# Patient Record
Sex: MALE | Race: OTHER | Employment: FULL TIME | URBAN - METROPOLITAN AREA
[De-identification: names, ages, dates, MRNs, and addresses within clinical notes are randomized per-mention and may not be internally consistent; named-entity substitution may affect disease eponyms.]

---

## 2017-03-12 ENCOUNTER — HOSPITAL ENCOUNTER (OUTPATIENT)
Age: 42
Discharge: HOME | End: 2017-03-12
Payer: COMMERCIAL

## 2017-03-12 VITALS
TEMPERATURE: 98 F | WEIGHT: 232 LBS | RESPIRATION RATE: 16 BRPM | HEART RATE: 93 BPM | BODY MASS INDEX: 32.48 KG/M2 | HEIGHT: 71 IN | DIASTOLIC BLOOD PRESSURE: 90 MMHG | SYSTOLIC BLOOD PRESSURE: 132 MMHG | OXYGEN SATURATION: 97 %

## 2017-03-12 DIAGNOSIS — J06.9 VIRAL UPPER RESPIRATORY TRACT INFECTION WITH COUGH: Primary | ICD-10-CM

## 2017-03-12 LAB — POCT RAPID STREP: NEGATIVE

## 2017-03-12 PROCEDURE — 87430 STREP A AG IA: CPT | Performed by: PHYSICIAN ASSISTANT

## 2017-03-12 PROCEDURE — 87081 CULTURE SCREEN ONLY: CPT | Performed by: PHYSICIAN ASSISTANT

## 2017-03-12 PROCEDURE — 99214 OFFICE O/P EST MOD 30 MIN: CPT

## 2017-03-12 RX ORDER — FLUTICASONE PROPIONATE 50 MCG
2 SPRAY, SUSPENSION (ML) NASAL DAILY
Qty: 16 G | Refills: 0 | Status: SHIPPED | OUTPATIENT
Start: 2017-03-12 | End: 2017-04-11

## 2017-03-12 RX ORDER — BENZONATATE 200 MG/1
200 CAPSULE ORAL 3 TIMES DAILY PRN
Qty: 30 CAPSULE | Refills: 0 | Status: SHIPPED | OUTPATIENT
Start: 2017-03-12 | End: 2017-03-19

## 2017-03-12 RX ORDER — LOSARTAN POTASSIUM 50 MG/1
TABLET ORAL DAILY
COMMUNITY
End: 2018-08-16

## 2017-03-12 RX ORDER — CODEINE PHOSPHATE AND GUAIFENESIN 10; 100 MG/5ML; MG/5ML
5 SOLUTION ORAL NIGHTLY PRN
Qty: 150 ML | Refills: 0 | Status: SHIPPED | OUTPATIENT
Start: 2017-03-12 | End: 2018-04-04

## 2017-03-12 NOTE — ED PROVIDER NOTES
Patient Seen in: [address] [facility]    History   Patient presents with:  Cough/URI    Stated Complaint: ST    HPI    Mr. Adali Lamb is a pleasant 74-year-old male who comes in complaining of a cough and sore throat for approximately 2-3 weeks.   H User                        Types: Chew    Comment: Uses chewing tobacco daily      Review of Systems    Positive for stated complaint: ST  Other systems are as noted in HPI. Constitutional and vital signs reviewed.       All other systems reviewed and neg today and remains so upon discharge.  I discuss the plan of care with the patient, who expresses understanding.  All questions and concerns are addressed to the patient's satisfaction prior to discharge today.       Disposition and Plan     Clinical Impress

## 2017-04-26 ENCOUNTER — HOSPITAL ENCOUNTER (OUTPATIENT)
Age: 42
Discharge: HOME | End: 2017-04-26
Payer: COMMERCIAL

## 2017-04-26 VITALS
WEIGHT: 233 LBS | RESPIRATION RATE: 18 BRPM | DIASTOLIC BLOOD PRESSURE: 96 MMHG | BODY MASS INDEX: 32.62 KG/M2 | SYSTOLIC BLOOD PRESSURE: 134 MMHG | HEIGHT: 71 IN | TEMPERATURE: 98 F | OXYGEN SATURATION: 97 % | HEART RATE: 88 BPM

## 2017-04-26 DIAGNOSIS — J06.9 VIRAL UPPER RESPIRATORY ILLNESS: Primary | ICD-10-CM

## 2017-04-26 PROCEDURE — 99214 OFFICE O/P EST MOD 30 MIN: CPT

## 2017-04-26 PROCEDURE — 99213 OFFICE O/P EST LOW 20 MIN: CPT

## 2017-04-26 RX ORDER — CODEINE PHOSPHATE AND GUAIFENESIN 10; 100 MG/5ML; MG/5ML
10 SOLUTION ORAL NIGHTLY PRN
Qty: 118 ML | Refills: 0 | Status: SHIPPED | OUTPATIENT
Start: 2017-04-26 | End: 2018-04-04

## 2017-04-26 RX ORDER — METHYLPREDNISOLONE 4 MG/1
TABLET ORAL
Qty: 1 PACKAGE | Refills: 0 | Status: SHIPPED | OUTPATIENT
Start: 2017-04-26 | End: 2018-04-04

## 2017-04-26 RX ORDER — MONTELUKAST SODIUM 10 MG/1
10 TABLET ORAL NIGHTLY
Qty: 21 TABLET | Refills: 0 | Status: SHIPPED | OUTPATIENT
Start: 2017-04-26 | End: 2018-04-04

## 2017-04-26 NOTE — ED PROVIDER NOTES
Patient Seen in: [address] [facility]    History   Patient presents with:  Cough    Stated Complaint: dry cough x 10 days    HPI    Patient is a pleasant 16-year-old male with a history of hypertension and anxiety.   Patient arrives for evaluat Maternal Grandmother    • Cancer Sister      Breast.   • Heart Disease Brother 50     MI. Alive.    • Stroke Neg          Smoking Status: Never Smoker                      Smokeless Status: Current User                        Types: Chew    Comment: Uses c on a Medrol Dosepak and Singulair at night. Push clear fluids. Monitor for colored nasal secretions. Monitor for fever or chills. Monitor for night sweats or myalgias. At this time, he is well-appearing with normal vital signs.   He has audible nasal c

## 2018-04-04 ENCOUNTER — HOSPITAL ENCOUNTER (OUTPATIENT)
Age: 43
Discharge: HOME | End: 2018-04-04
Attending: FAMILY MEDICINE
Payer: COMMERCIAL

## 2018-04-04 ENCOUNTER — TELEPHONE (OUTPATIENT)
Dept: FAMILY MEDICINE CLINIC | Facility: CLINIC | Age: 43
End: 2018-04-04

## 2018-04-04 VITALS
SYSTOLIC BLOOD PRESSURE: 140 MMHG | DIASTOLIC BLOOD PRESSURE: 107 MMHG | RESPIRATION RATE: 16 BRPM | OXYGEN SATURATION: 99 % | WEIGHT: 230 LBS | BODY MASS INDEX: 32 KG/M2 | TEMPERATURE: 98 F | HEART RATE: 76 BPM

## 2018-04-04 DIAGNOSIS — I10 UNCONTROLLED HYPERTENSION: Primary | ICD-10-CM

## 2018-04-04 PROCEDURE — 99214 OFFICE O/P EST MOD 30 MIN: CPT

## 2018-04-04 PROCEDURE — 36415 COLL VENOUS BLD VENIPUNCTURE: CPT

## 2018-04-04 PROCEDURE — 93010 ELECTROCARDIOGRAM REPORT: CPT

## 2018-04-04 PROCEDURE — 93005 ELECTROCARDIOGRAM TRACING: CPT

## 2018-04-04 PROCEDURE — 84484 ASSAY OF TROPONIN QUANT: CPT

## 2018-04-04 RX ORDER — AMLODIPINE BESYLATE 5 MG/1
5 TABLET ORAL DAILY
Qty: 30 TABLET | Refills: 0 | Status: SHIPPED | OUTPATIENT
Start: 2018-04-04 | End: 2018-05-04

## 2018-04-04 RX ORDER — PANTOPRAZOLE SODIUM 40 MG/1
40 TABLET, DELAYED RELEASE ORAL
COMMUNITY
End: 2019-03-07

## 2018-04-04 NOTE — TELEPHONE ENCOUNTER
PT WOULD LIKE TO SEE IF DR TRERY WOULD ACCEPT PT AS NEW. PT HAS [employer] INS-IN EPIC.     PT GOING TO -BP RUNNING AROUND 167/118 (PT ON BP MEDS)

## 2018-04-04 NOTE — TELEPHONE ENCOUNTER
Call back to patient. Patient at Urgent Care in [place]. Stated they are going to do EKG. Stated he will call us back when he is finished at [facility]. Stated he wanted to know if Dr Sepideh Ghotra would accept him as a new patient.

## 2018-04-04 NOTE — TELEPHONE ENCOUNTER
Patient went to [facility] in [facility]. Patient would like to know if Dr Oniel Torres is taking new patients. Verbal from Dr Oniel Torres, ok to see patient later on this week. Patient stated he will call back to make an appointment.

## 2018-04-04 NOTE — ED INITIAL ASSESSMENT (HPI)
Pt sts elevated BP (more than usual) for the past 1 week. C/o HA. Denies chest pain, SOB, blurred vision. Feels like needs to take deep breaths on occasion.

## 2018-04-04 NOTE — ED PROVIDER NOTES
Patient Seen in: [address] [facility]    History   Patient presents with:  Blood Pressure    Stated Complaint: ELEVATED BLOOD PRESSURE    HPI  78-year-old male with a history of hypertension presents to the immediate care today with chief comp air)    Current:BP (!) 140/107   Pulse 76   Temp 97.7 °F (36.5 °C) (Temporal)   Resp 16   Wt 104.3 kg   SpO2 99%   BMI 32.08 kg/m²         Physical Exam    General appearance: alert, appears stated age and cooperative  Head: Normocephalic, without obvious of 240/120mmHg or if develops chest pain, shortness of breath, palpitations, diaphoresis, worsening headache, blurred vision, diplopia, projectile vomiting, severe nausea, dizziness, lightheadedness, weakness, numbness, slurred speech, disorientation, conf

## 2018-04-06 ENCOUNTER — OFFICE VISIT (OUTPATIENT)
Dept: FAMILY MEDICINE CLINIC | Facility: CLINIC | Age: 43
End: 2018-04-06

## 2018-04-06 VITALS
BODY MASS INDEX: 32.72 KG/M2 | SYSTOLIC BLOOD PRESSURE: 128 MMHG | WEIGHT: 226 LBS | HEIGHT: 69.5 IN | HEART RATE: 76 BPM | TEMPERATURE: 98 F | DIASTOLIC BLOOD PRESSURE: 104 MMHG | RESPIRATION RATE: 12 BRPM

## 2018-04-06 DIAGNOSIS — I10 HYPERTENSION, UNSPECIFIED TYPE: Primary | ICD-10-CM

## 2018-04-06 DIAGNOSIS — R73.9 HYPERGLYCEMIA: ICD-10-CM

## 2018-04-06 PROCEDURE — 99203 OFFICE O/P NEW LOW 30 MIN: CPT | Performed by: FAMILY MEDICINE

## 2018-04-06 PROCEDURE — 83036 HEMOGLOBIN GLYCOSYLATED A1C: CPT | Performed by: FAMILY MEDICINE

## 2018-04-06 PROCEDURE — 80050 GENERAL HEALTH PANEL: CPT | Performed by: FAMILY MEDICINE

## 2018-04-06 PROCEDURE — 36415 COLL VENOUS BLD VENIPUNCTURE: CPT | Performed by: FAMILY MEDICINE

## 2018-04-06 NOTE — PROGRESS NOTES
Emily Levine is a 39year old male. CC:  Patient presents with:  Consult: Meet the Doctor, & discuss anxiety ,bp       HPI:  New patient. Has had HTN since circa 2004. Recently seen in UC for elevated BP.  Patient states his systolic blood pressure h pain  Respiratory: he does snore    Vitals: BP (!) 128/104 (BP Location: Right arm, Patient Position: Sitting, Cuff Size: large)   Pulse 76   Temp 97.6 °F (36.4 °C) (Temporal)   Resp 12   Ht 69.5\"   Wt 226 lb   BMI 32.90 kg/m²    Reviewed by HARJIT Gonzalez

## 2018-04-06 NOTE — PROGRESS NOTES
Patient presents today for labs. 1 light green, 1 lavender drawn from right ac with 1 attempt with straight needle. Patient left office in stable condition.

## 2018-04-18 ENCOUNTER — OFFICE VISIT (OUTPATIENT)
Dept: FAMILY MEDICINE CLINIC | Facility: CLINIC | Age: 43
End: 2018-04-18

## 2018-04-18 VITALS
SYSTOLIC BLOOD PRESSURE: 130 MMHG | RESPIRATION RATE: 18 BRPM | WEIGHT: 221.81 LBS | TEMPERATURE: 98 F | BODY MASS INDEX: 32 KG/M2 | HEART RATE: 70 BPM | DIASTOLIC BLOOD PRESSURE: 82 MMHG

## 2018-04-18 DIAGNOSIS — IMO0001 UNCONTROLLED TYPE 2 DIABETES MELLITUS WITHOUT COMPLICATION, WITHOUT LONG-TERM CURRENT USE OF INSULIN: Primary | ICD-10-CM

## 2018-04-18 DIAGNOSIS — I10 HYPERTENSION, UNSPECIFIED TYPE: ICD-10-CM

## 2018-04-18 PROCEDURE — 82570 ASSAY OF URINE CREATININE: CPT | Performed by: FAMILY MEDICINE

## 2018-04-18 PROCEDURE — 82043 UR ALBUMIN QUANTITATIVE: CPT | Performed by: FAMILY MEDICINE

## 2018-04-18 PROCEDURE — 99214 OFFICE O/P EST MOD 30 MIN: CPT | Performed by: FAMILY MEDICINE

## 2018-04-18 NOTE — PROGRESS NOTES
Alo Jang is a 39year old male.     CC:  Patient presents with:  Blood Pressure      HPI:  Here to follow up hypertension  Home BP readings: none  Medication side effects: some constipation since starting amlodipine  Chest pain: none  Headaches: non BMI 32.28 kg/m²    Reviewed by Ela Chinchilla M.D.     Physical Exam:  GEN: well developed, well nourished, in no apparent distress  EYE: B conjunctiva and lids normal  HENT: normocephalic; normal nose, pharynx and TM's  NECK: No lymphadenopathy, thyromegaly o

## 2018-05-07 ENCOUNTER — TELEPHONE (OUTPATIENT)
Dept: ENDOCRINOLOGY CLINIC | Facility: CLINIC | Age: 43
End: 2018-05-07

## 2018-05-07 DIAGNOSIS — IMO0001 UNCONTROLLED TYPE 2 DIABETES MELLITUS WITHOUT COMPLICATION, WITHOUT LONG-TERM CURRENT USE OF INSULIN: Primary | ICD-10-CM

## 2018-05-07 NOTE — TELEPHONE ENCOUNTER
Pt called to schedule an appt for diabetes education. I have pended an order. Please associate a dx and sign the order.  Thank you for the referral.

## 2018-05-14 ENCOUNTER — NURSE ONLY (OUTPATIENT)
Dept: ENDOCRINOLOGY CLINIC | Facility: CLINIC | Age: 43
End: 2018-05-14

## 2018-05-14 ENCOUNTER — TELEPHONE (OUTPATIENT)
Dept: ENDOCRINOLOGY CLINIC | Facility: CLINIC | Age: 43
End: 2018-05-14

## 2018-05-14 VITALS — BODY MASS INDEX: 32 KG/M2 | SYSTOLIC BLOOD PRESSURE: 138 MMHG | WEIGHT: 219 LBS | DIASTOLIC BLOOD PRESSURE: 90 MMHG

## 2018-05-14 DIAGNOSIS — IMO0001 UNCONTROLLED TYPE 2 DIABETES MELLITUS WITHOUT COMPLICATION, WITHOUT LONG-TERM CURRENT USE OF INSULIN: ICD-10-CM

## 2018-05-14 PROCEDURE — G0108 DIAB MANAGE TRN  PER INDIV: HCPCS | Performed by: DIETITIAN, REGISTERED

## 2018-05-19 NOTE — PROGRESS NOTES
Bárbara Costa  : 1975 attended Step 1 Diabetic Education:    Date: 2018  Referring MD: Dr. Earnestine Nagy  Start time: 4pm End time: 5pm    /90 (BP Location: Left arm, Patient Position: Sitting, Cuff Size: adult)   Wt 219 lb   BMI 31.88 kg/ 4/26 134             4/27         155     5/2       119       5/4       138       5/5 99             5/7 94             5/9 180      98  171     5/10 105        84/150     5/11       84  159     5/12       157       5/13       132       5/14    15

## 2018-05-22 ENCOUNTER — MED REC SCAN ONLY (OUTPATIENT)
Dept: FAMILY MEDICINE CLINIC | Facility: CLINIC | Age: 43
End: 2018-05-22

## 2018-05-22 ENCOUNTER — OFFICE VISIT (OUTPATIENT)
Dept: FAMILY MEDICINE CLINIC | Facility: CLINIC | Age: 43
End: 2018-05-22

## 2018-05-22 ENCOUNTER — TELEPHONE (OUTPATIENT)
Dept: FAMILY MEDICINE CLINIC | Facility: CLINIC | Age: 43
End: 2018-05-22

## 2018-05-22 VITALS
RESPIRATION RATE: 16 BRPM | HEIGHT: 69 IN | DIASTOLIC BLOOD PRESSURE: 88 MMHG | WEIGHT: 218 LBS | SYSTOLIC BLOOD PRESSURE: 120 MMHG | HEART RATE: 76 BPM | TEMPERATURE: 98 F | BODY MASS INDEX: 32.29 KG/M2

## 2018-05-22 DIAGNOSIS — J06.9 VIRAL UPPER RESPIRATORY TRACT INFECTION: Primary | ICD-10-CM

## 2018-05-22 DIAGNOSIS — IMO0001 UNCONTROLLED TYPE 2 DIABETES MELLITUS WITHOUT COMPLICATION, WITHOUT LONG-TERM CURRENT USE OF INSULIN: ICD-10-CM

## 2018-05-22 PROCEDURE — 83036 HEMOGLOBIN GLYCOSYLATED A1C: CPT | Performed by: FAMILY MEDICINE

## 2018-05-22 PROCEDURE — 36415 COLL VENOUS BLD VENIPUNCTURE: CPT | Performed by: FAMILY MEDICINE

## 2018-05-22 PROCEDURE — 99214 OFFICE O/P EST MOD 30 MIN: CPT | Performed by: FAMILY MEDICINE

## 2018-05-22 NOTE — TELEPHONE ENCOUNTER
Pt would like something for a sore throat, head and chest cold. He is willing to come in if TJ thinks that is needed.  Other wise please send something to [facility], [address] [facility]. [phone], [phone]

## 2018-05-22 NOTE — TELEPHONE ENCOUNTER
Patient notified and verbalized understanding of information given. appt booked.   Future Appointments  Date Time Provider Jose Khan   5/22/2018 12:30 PM Yuliya Martin MD [facility] Kiran Perry   6/4/2018 4:00 PM Cheryl Ganser, RN, CDE [facility]

## 2018-05-22 NOTE — TELEPHONE ENCOUNTER
Called patient he states symptoms started Sunday night. Has been in contact with kids with colds. Sunday night sore throat, patient states slight fever last night, he did not take temp. Monday morning woke up with head and chest congestion, body aches.

## 2018-05-22 NOTE — PROGRESS NOTES
Received diabetic eye exam report from [facility]. Updated HM and flow sheet sent to scanning.

## 2018-06-04 ENCOUNTER — NURSE ONLY (OUTPATIENT)
Dept: ENDOCRINOLOGY CLINIC | Facility: CLINIC | Age: 43
End: 2018-06-04

## 2018-06-04 DIAGNOSIS — IMO0001 UNCONTROLLED TYPE 2 DIABETES MELLITUS WITHOUT COMPLICATION, WITHOUT LONG-TERM CURRENT USE OF INSULIN: ICD-10-CM

## 2018-06-04 PROCEDURE — G0108 DIAB MANAGE TRN  PER INDIV: HCPCS | Performed by: DIETITIAN, REGISTERED

## 2018-06-04 RX ORDER — BLOOD SUGAR DIAGNOSTIC
STRIP MISCELLANEOUS
Qty: 300 STRIP | Refills: 3 | Status: SHIPPED | OUTPATIENT
Start: 2018-06-04 | End: 2019-06-04

## 2018-06-05 VITALS — SYSTOLIC BLOOD PRESSURE: 140 MMHG | BODY MASS INDEX: 31 KG/M2 | DIASTOLIC BLOOD PRESSURE: 84 MMHG | WEIGHT: 210.38 LBS

## 2018-06-05 NOTE — PROGRESS NOTES
Mustapha Young  : 1975 was seen for Diabetic Education Follow up:    Date: 2018  Referring MD: Dr. Ferny Galo Start time: 4pm End time: 5pm    Assessment:     Assessment: /84 (BP Location: Left arm, Patient Position: Sitting, Cuff Size: a use of snacks/fiber, barriers: pt. has been reading labels & staying within 45 grams of carb per meal & <8 grams of sugar but sometimes BG still spikes    BEING ACTIVE:  - types of activity, frequency , duration: going to gym 3 x/wk but would like to incre understanding and has no further questions at this time.     Magdalena Goldberg RN, CDE

## 2018-06-07 NOTE — PROGRESS NOTES
Addended by: MELVIN TOLBERT on: 6/7/2018 07:01 AM     Modules accepted: Orders     Kathy Ordonez is a 39year old male. CC:  Patient presents with:  Sore Throat: head and chest congestion per pt      HPI:  The patient has primary complaint of nasal congestion for  3 days. Associated symptoms include chills and sore throat.  The mago d/c, + posterior OP erythema, B pinnas, external auditory canals and tympanic membranes are normal.   NECK: No lymphadenopathy, thyromegaly or masses  CAR: S1, S2 normal, RRR; no S3, no S4; no click; murmur negative  PULM: clear to auscultation B, no acces

## 2018-08-10 ENCOUNTER — TELEPHONE (OUTPATIENT)
Dept: INTERNAL MEDICINE CLINIC | Facility: CLINIC | Age: 43
End: 2018-08-10

## 2018-08-10 NOTE — TELEPHONE ENCOUNTER
Appointment canceled for [facility] ([id number])   Visit Type: DIAB FOLLOW UP   Date        Time      Length    Provider                  Department   9/10/2018    4:00 PM  30 mins. Kelli Dodge, RN, CDE [facility] [address]      Reason for [employer]

## 2018-08-16 ENCOUNTER — OFFICE VISIT (OUTPATIENT)
Dept: FAMILY MEDICINE CLINIC | Facility: CLINIC | Age: 43
End: 2018-08-16
Payer: COMMERCIAL

## 2018-08-16 VITALS
TEMPERATURE: 98 F | WEIGHT: 206 LBS | RESPIRATION RATE: 12 BRPM | DIASTOLIC BLOOD PRESSURE: 82 MMHG | HEART RATE: 78 BPM | BODY MASS INDEX: 30.51 KG/M2 | SYSTOLIC BLOOD PRESSURE: 132 MMHG | HEIGHT: 69 IN

## 2018-08-16 DIAGNOSIS — J02.9 SORE THROAT: Primary | ICD-10-CM

## 2018-08-16 DIAGNOSIS — IMO0001 UNCONTROLLED TYPE 2 DIABETES MELLITUS WITHOUT COMPLICATION, WITHOUT LONG-TERM CURRENT USE OF INSULIN: ICD-10-CM

## 2018-08-16 DIAGNOSIS — M62.838 CERVICAL PARASPINAL MUSCLE SPASM: ICD-10-CM

## 2018-08-16 LAB
EST. AVERAGE GLUCOSE BLD GHB EST-MCNC: 137 MG/DL (ref 68–126)
HBA1C MFR BLD HPLC: 6.4 % (ref ?–5.7)

## 2018-08-16 PROCEDURE — 99214 OFFICE O/P EST MOD 30 MIN: CPT | Performed by: FAMILY MEDICINE

## 2018-08-16 PROCEDURE — 83036 HEMOGLOBIN GLYCOSYLATED A1C: CPT | Performed by: FAMILY MEDICINE

## 2018-08-16 PROCEDURE — 36415 COLL VENOUS BLD VENIPUNCTURE: CPT | Performed by: FAMILY MEDICINE

## 2018-08-16 RX ORDER — CYCLOBENZAPRINE HCL 10 MG
10 TABLET ORAL NIGHTLY
Qty: 20 TABLET | Refills: 0 | Status: SHIPPED | OUTPATIENT
Start: 2018-08-16 | End: 2018-09-05

## 2018-08-16 RX ORDER — LOSARTAN POTASSIUM 50 MG/1
50 TABLET ORAL DAILY
Qty: 90 TABLET | Refills: 1 | Status: SHIPPED | OUTPATIENT
Start: 2018-08-16 | End: 2018-11-02

## 2018-08-16 NOTE — PROGRESS NOTES
Anibal Camacho is a 39year old male. CC:  Patient presents with:  Sore Throat: per pt  Neck Pain: stiff jaw and neck      HPI:    He has noted 2 weeks of sore throat, neck spasm and jaw stiffness. He has not had fevers.  He is concerned because he was normocephalic; normal nose, pharynx and TM's  NECK: No lymphadenopathy, thyromegaly or masses  CAR: S1, S2 normal, RRR; no S3, no S4; no click; murmur negative  PULM: clear to auscultation B, no accessory muscle use  GI: not examined  PSYCH: alert and orie

## 2018-09-12 ENCOUNTER — MED REC SCAN ONLY (OUTPATIENT)
Dept: FAMILY MEDICINE CLINIC | Facility: CLINIC | Age: 43
End: 2018-09-12

## 2018-11-02 ENCOUNTER — OFFICE VISIT (OUTPATIENT)
Dept: FAMILY MEDICINE CLINIC | Facility: CLINIC | Age: 43
End: 2018-11-02
Payer: COMMERCIAL

## 2018-11-02 VITALS
TEMPERATURE: 98 F | DIASTOLIC BLOOD PRESSURE: 95 MMHG | SYSTOLIC BLOOD PRESSURE: 130 MMHG | HEART RATE: 64 BPM | BODY MASS INDEX: 29.97 KG/M2 | WEIGHT: 207 LBS | RESPIRATION RATE: 16 BRPM | HEIGHT: 69.5 IN

## 2018-11-02 DIAGNOSIS — F41.9 ANXIETY: ICD-10-CM

## 2018-11-02 DIAGNOSIS — Z00.00 WELL ADULT EXAM: Primary | ICD-10-CM

## 2018-11-02 DIAGNOSIS — I10 HYPERTENSION, UNSPECIFIED TYPE: ICD-10-CM

## 2018-11-02 DIAGNOSIS — E11.9 TYPE 2 DIABETES MELLITUS WITHOUT COMPLICATION, WITHOUT LONG-TERM CURRENT USE OF INSULIN (HCC): ICD-10-CM

## 2018-11-02 DIAGNOSIS — D58.2 ELEVATED HEMOGLOBIN (HCC): ICD-10-CM

## 2018-11-02 PROBLEM — J30.9 ALLERGIC RHINITIS: Status: ACTIVE | Noted: 2018-11-02

## 2018-11-02 PROBLEM — IMO0001 UNCONTROLLED TYPE 2 DIABETES MELLITUS WITHOUT COMPLICATION, WITHOUT LONG-TERM CURRENT USE OF INSULIN: Status: RESOLVED | Noted: 2018-04-18 | Resolved: 2018-11-02

## 2018-11-02 PROCEDURE — 83036 HEMOGLOBIN GLYCOSYLATED A1C: CPT | Performed by: FAMILY MEDICINE

## 2018-11-02 PROCEDURE — 36415 COLL VENOUS BLD VENIPUNCTURE: CPT | Performed by: FAMILY MEDICINE

## 2018-11-02 PROCEDURE — 99396 PREV VISIT EST AGE 40-64: CPT | Performed by: FAMILY MEDICINE

## 2018-11-02 PROCEDURE — 90471 IMMUNIZATION ADMIN: CPT | Performed by: FAMILY MEDICINE

## 2018-11-02 PROCEDURE — 80050 GENERAL HEALTH PANEL: CPT | Performed by: FAMILY MEDICINE

## 2018-11-02 PROCEDURE — 80061 LIPID PANEL: CPT | Performed by: FAMILY MEDICINE

## 2018-11-02 PROCEDURE — 83540 ASSAY OF IRON: CPT | Performed by: FAMILY MEDICINE

## 2018-11-02 PROCEDURE — 82043 UR ALBUMIN QUANTITATIVE: CPT | Performed by: FAMILY MEDICINE

## 2018-11-02 PROCEDURE — 90686 IIV4 VACC NO PRSV 0.5 ML IM: CPT | Performed by: FAMILY MEDICINE

## 2018-11-02 PROCEDURE — 82728 ASSAY OF FERRITIN: CPT | Performed by: FAMILY MEDICINE

## 2018-11-02 PROCEDURE — 83550 IRON BINDING TEST: CPT | Performed by: FAMILY MEDICINE

## 2018-11-02 PROCEDURE — 82570 ASSAY OF URINE CREATININE: CPT | Performed by: FAMILY MEDICINE

## 2018-11-02 RX ORDER — FLUTICASONE PROPIONATE 50 MCG
2 SPRAY, SUSPENSION (ML) NASAL DAILY
COMMUNITY
Start: 2018-11-02 | End: 2020-11-11

## 2018-11-02 RX ORDER — ESCITALOPRAM OXALATE 10 MG/1
10 TABLET ORAL DAILY
Qty: 90 TABLET | Refills: 0 | Status: SHIPPED | OUTPATIENT
Start: 2018-11-02 | End: 2020-04-01

## 2018-11-02 RX ORDER — AZELASTINE HYDROCHLORIDE 137 UG/1
SPRAY, METERED NASAL
Refills: 0 | COMMUNITY
Start: 2018-11-02 | End: 2020-11-11

## 2018-11-02 RX ORDER — LOSARTAN POTASSIUM 100 MG/1
100 TABLET ORAL DAILY
Qty: 90 TABLET | Refills: 0 | Status: SHIPPED | OUTPATIENT
Start: 2018-11-02 | End: 2019-04-04

## 2018-11-02 NOTE — PROGRESS NOTES
Cuong Meyer is a 39year old male.     CC:  Patient presents with:  Physical: and labs and flu vaccine per pt      HPI:  Yearly PX  Last lipid in EMR  Component      Latest Ref Rng & Units 2/3/2015   CHOLESTEROL, TOTAL      100 - 199 mg/dL 160   Trigly FINE Does not apply Misc Test 3 times daily Disp: 3 Box Rfl: 3   Pantoprazole Sodium 40 MG Oral Tab EC Take 40 mg by mouth every morning before breakfast. Disp:  Rfl:         History:  Past Medical History:   Diagnosis Date   • Anxiety    • Diabetes ([facility] [place] [address] clear discharge.  + PND, o/w no oral lesions, B pinnas, external auditory canals and tympanic membranes are normal.   NECK: No lymphadenopathy, thyromegaly or masses  CAR: S1, S2 normal, RRR; no S3, no S4; no click; murmur negative  PULM: clear to auscultat These side effects will typically pass in 1 week.  The patient was warned of potential of worsening of mood on the antidepressant---if this is to happen then immediately stop the medication and call the office as this can cause an increased risk of homicida No Follow-up on file.       Authorized by Nuris Golden M.D.

## 2019-01-08 ENCOUNTER — TELEPHONE (OUTPATIENT)
Dept: FAMILY MEDICINE CLINIC | Facility: CLINIC | Age: 44
End: 2019-01-08

## 2019-01-08 NOTE — TELEPHONE ENCOUNTER
Patient called and states that he is doing fine and he is not currently taking the Lexapro medication. He will call back and schedule an appointment if he feels he needs to start the medication.  States that he just goes for a run if he gets stressed and t

## 2019-03-07 ENCOUNTER — TELEPHONE (OUTPATIENT)
Dept: FAMILY MEDICINE CLINIC | Facility: CLINIC | Age: 44
End: 2019-03-07

## 2019-03-07 DIAGNOSIS — E11.9 TYPE 2 DIABETES MELLITUS WITHOUT COMPLICATION, WITHOUT LONG-TERM CURRENT USE OF INSULIN (HCC): Primary | ICD-10-CM

## 2019-03-07 RX ORDER — PANTOPRAZOLE SODIUM 40 MG/1
40 TABLET, DELAYED RELEASE ORAL
Qty: 90 TABLET | Refills: 0 | Status: SHIPPED | OUTPATIENT
Start: 2019-03-07 | End: 2020-06-23

## 2019-03-07 NOTE — TELEPHONE ENCOUNTER
Please let patient know or leave message that a1c order placed and Protonix sent to the [facility] in [place] that hs been used before.  Thanks

## 2019-03-07 NOTE — TELEPHONE ENCOUNTER
Called pt to schedule BP Check, he is driving and will call back. Pt said that he is probably due for A1C check (pls place order, if needed) Also, will TJ prescribe pantoprozole (thinks 10mg?) for gerd?  Pt advised that TJ knows his former MD prescribe this

## 2019-04-04 ENCOUNTER — OFFICE VISIT (OUTPATIENT)
Dept: FAMILY MEDICINE CLINIC | Facility: CLINIC | Age: 44
End: 2019-04-04
Payer: COMMERCIAL

## 2019-04-04 VITALS
TEMPERATURE: 97 F | BODY MASS INDEX: 31 KG/M2 | HEART RATE: 78 BPM | OXYGEN SATURATION: 99 % | DIASTOLIC BLOOD PRESSURE: 86 MMHG | WEIGHT: 212.81 LBS | RESPIRATION RATE: 16 BRPM | SYSTOLIC BLOOD PRESSURE: 150 MMHG

## 2019-04-04 DIAGNOSIS — I10 HYPERTENSION, UNSPECIFIED TYPE: ICD-10-CM

## 2019-04-04 DIAGNOSIS — E11.9 TYPE 2 DIABETES MELLITUS WITHOUT COMPLICATION, WITHOUT LONG-TERM CURRENT USE OF INSULIN (HCC): ICD-10-CM

## 2019-04-04 DIAGNOSIS — M77.12 LATERAL EPICONDYLITIS OF LEFT ELBOW: Primary | ICD-10-CM

## 2019-04-04 PROCEDURE — 99214 OFFICE O/P EST MOD 30 MIN: CPT | Performed by: FAMILY MEDICINE

## 2019-04-04 PROCEDURE — 36415 COLL VENOUS BLD VENIPUNCTURE: CPT | Performed by: FAMILY MEDICINE

## 2019-04-04 PROCEDURE — 83036 HEMOGLOBIN GLYCOSYLATED A1C: CPT | Performed by: FAMILY MEDICINE

## 2019-04-04 RX ORDER — LOSARTAN POTASSIUM 100 MG/1
100 TABLET ORAL DAILY
Qty: 90 TABLET | Refills: 1 | Status: SHIPPED | OUTPATIENT
Start: 2019-04-04 | End: 2019-10-30

## 2019-04-04 RX ORDER — ALBUTEROL SULFATE 90 UG/1
2 AEROSOL, METERED RESPIRATORY (INHALATION) EVERY 4 HOURS PRN
COMMUNITY
End: 2020-11-11

## 2019-04-04 NOTE — PROGRESS NOTES
Cuong Meyer is a 38year old male.     CC:  Patient presents with:  Arm Pain: per pt- left arm injury       HPI:  The patient has noted musculoskeletal pain:  Location: left lateral elbow  Duration: 6 week(s)  Injury: happened after playing Wii  Other MGMA (Not Specified)   • Sis (Not Specified)   • Bro (Not Specified)   • Mo Alive   • Fa Alive   • NEG (Not Specified)      Social History    Tobacco Use      Smoking status: Never Smoker      Smokeless tobacco: Current User        Types: Chew      Tobacco VENIPUNCTURE  - HEMOGLOBIN A1C; Future      Orders for this visit:  Orders Placed This Encounter      Hemoglobin A1C [E]      *Venipuncture      Orders Placed This Encounter      Hemoglobin A1C [E]          Standing Status: Future          Standing Expirat

## 2019-08-14 ENCOUNTER — OFFICE VISIT (OUTPATIENT)
Dept: FAMILY MEDICINE CLINIC | Facility: CLINIC | Age: 44
End: 2019-08-14
Payer: COMMERCIAL

## 2019-08-14 VITALS
TEMPERATURE: 97 F | RESPIRATION RATE: 12 BRPM | SYSTOLIC BLOOD PRESSURE: 132 MMHG | BODY MASS INDEX: 31.52 KG/M2 | HEART RATE: 96 BPM | OXYGEN SATURATION: 98 % | DIASTOLIC BLOOD PRESSURE: 84 MMHG | HEIGHT: 69 IN | WEIGHT: 212.81 LBS

## 2019-08-14 DIAGNOSIS — E11.9 TYPE 2 DIABETES MELLITUS WITHOUT COMPLICATION, WITHOUT LONG-TERM CURRENT USE OF INSULIN (HCC): ICD-10-CM

## 2019-08-14 DIAGNOSIS — R21 RASH: Primary | ICD-10-CM

## 2019-08-14 LAB
EST. AVERAGE GLUCOSE BLD GHB EST-MCNC: 143 MG/DL (ref 68–126)
HBA1C MFR BLD HPLC: 6.6 % (ref ?–5.7)

## 2019-08-14 PROCEDURE — 36415 COLL VENOUS BLD VENIPUNCTURE: CPT | Performed by: FAMILY MEDICINE

## 2019-08-14 PROCEDURE — 99214 OFFICE O/P EST MOD 30 MIN: CPT | Performed by: FAMILY MEDICINE

## 2019-08-14 PROCEDURE — 82043 UR ALBUMIN QUANTITATIVE: CPT | Performed by: FAMILY MEDICINE

## 2019-08-14 PROCEDURE — 83036 HEMOGLOBIN GLYCOSYLATED A1C: CPT | Performed by: FAMILY MEDICINE

## 2019-08-14 PROCEDURE — 82570 ASSAY OF URINE CREATININE: CPT | Performed by: FAMILY MEDICINE

## 2019-08-14 RX ORDER — CEFDINIR 300 MG/1
300 CAPSULE ORAL 2 TIMES DAILY
Qty: 20 CAPSULE | Refills: 0 | Status: SHIPPED | OUTPATIENT
Start: 2019-08-14 | End: 2020-03-05

## 2019-08-14 NOTE — PROGRESS NOTES
Luis Acharya is a 38year old male. CC:  Patient presents with:  Derm Problem: per pt- on stomach, x5 days, burns to the touch      HPI:  RLQ rash for about one month. No recall of exposure. The rash does itch but not hurt.  No prodromal symptoms mery Family Status   Relation Status   • MGMA (Not Specified)   • Sis (Not Specified)   • Bro (Not Specified)   • Mo Alive   • Fa Alive   • NEG (Not Specified)      Social History    Tobacco Use      Smoking status: Never Smoker      Smokeless tobacco: Gerhardt Manos Encounter      Hemoglobin A1C [E]          Standing Status: Future          Number of Occurrences: 1          Standing Expiration Date: 8/14/2020      Microalb/Creat Ratio, Random Urine          Standing Status: Future          Number of Occurrences: 1

## 2019-08-15 LAB
CREAT UR-SCNC: 202 MG/DL
MICROALBUMIN UR-MCNC: 1.43 MG/DL
MICROALBUMIN/CREAT 24H UR-RTO: 7.1 UG/MG (ref ?–30)

## 2019-10-30 RX ORDER — LOSARTAN POTASSIUM 100 MG/1
TABLET ORAL
Qty: 90 TABLET | Refills: 1 | Status: SHIPPED | OUTPATIENT
Start: 2019-10-30 | End: 2020-06-22

## 2020-03-05 ENCOUNTER — OFFICE VISIT (OUTPATIENT)
Dept: FAMILY MEDICINE CLINIC | Facility: CLINIC | Age: 45
End: 2020-03-05
Payer: COMMERCIAL

## 2020-03-05 VITALS
DIASTOLIC BLOOD PRESSURE: 88 MMHG | OXYGEN SATURATION: 99 % | WEIGHT: 223.19 LBS | RESPIRATION RATE: 16 BRPM | HEIGHT: 69 IN | BODY MASS INDEX: 33.06 KG/M2 | TEMPERATURE: 98 F | SYSTOLIC BLOOD PRESSURE: 138 MMHG | HEART RATE: 70 BPM

## 2020-03-05 DIAGNOSIS — E11.9 TYPE 2 DIABETES MELLITUS WITHOUT COMPLICATION, WITHOUT LONG-TERM CURRENT USE OF INSULIN (HCC): ICD-10-CM

## 2020-03-05 DIAGNOSIS — J02.9 SORE THROAT: Primary | ICD-10-CM

## 2020-03-05 DIAGNOSIS — Z72.0 CHEWING TOBACCO USE: ICD-10-CM

## 2020-03-05 PROCEDURE — 83036 HEMOGLOBIN GLYCOSYLATED A1C: CPT | Performed by: FAMILY MEDICINE

## 2020-03-05 PROCEDURE — 99214 OFFICE O/P EST MOD 30 MIN: CPT | Performed by: FAMILY MEDICINE

## 2020-03-05 PROCEDURE — 36415 COLL VENOUS BLD VENIPUNCTURE: CPT | Performed by: FAMILY MEDICINE

## 2020-03-05 NOTE — PROGRESS NOTES
Anibal Camacho is a 54year old male. CC:  Patient presents with:  Sore Throat: per pt      HPI:  The patient has primary complaint of sore throat for  2 weeks. Associated symptoms include upper tooth pain. The patient has not had temperatures.  There Onset   • Cancer Maternal Grandmother    • Cancer Sister         Breast.   • Heart Disease Brother 50        MI. Alive.    • Cancer Father         Prostate   • Stroke Neg       Family Status   Relation Status   • MGMA (Not Specified)   • Sis (Not Specified Moving all extremities equally. ASSESSMENT AND PLAN    1. Sore throat  I do not see an obvious cause for his symptoms.  Because of his history of chewing tobacco use and GERD he has been recommended to see his ENT who can better visualize throat structur

## 2020-03-06 ENCOUNTER — TELEPHONE (OUTPATIENT)
Dept: FAMILY MEDICINE CLINIC | Facility: CLINIC | Age: 45
End: 2020-03-06

## 2020-03-06 DIAGNOSIS — E11.9 TYPE 2 DIABETES MELLITUS WITHOUT COMPLICATION, WITHOUT LONG-TERM CURRENT USE OF INSULIN (HCC): Primary | ICD-10-CM

## 2020-03-06 LAB
EST. AVERAGE GLUCOSE BLD GHB EST-MCNC: 160 MG/DL (ref 68–126)
HBA1C MFR BLD HPLC: 7.2 % (ref ?–5.7)

## 2020-03-13 ENCOUNTER — MED REC SCAN ONLY (OUTPATIENT)
Dept: FAMILY MEDICINE CLINIC | Facility: CLINIC | Age: 45
End: 2020-03-13

## 2020-04-01 RX ORDER — ESCITALOPRAM OXALATE 10 MG/1
10 TABLET ORAL DAILY
Qty: 90 TABLET | Refills: 0 | Status: SHIPPED | OUTPATIENT
Start: 2020-04-01 | End: 2021-03-21

## 2020-04-01 NOTE — TELEPHONE ENCOUNTER
Last refilled on 11/2/18 for # 90 with 0 refills  [facility] sent back to patient-has he been taking this regularly?

## 2020-06-03 ENCOUNTER — TELEPHONE (OUTPATIENT)
Dept: FAMILY MEDICINE CLINIC | Facility: CLINIC | Age: 45
End: 2020-06-03

## 2020-06-22 RX ORDER — LOSARTAN POTASSIUM 100 MG/1
TABLET ORAL
Qty: 90 TABLET | Refills: 0 | Status: SHIPPED | OUTPATIENT
Start: 2020-06-22 | End: 2020-11-11

## 2020-06-23 RX ORDER — PANTOPRAZOLE SODIUM 40 MG/1
40 TABLET, DELAYED RELEASE ORAL
Qty: 90 TABLET | Refills: 0 | Status: SHIPPED | OUTPATIENT
Start: 2020-06-23 | End: 2020-11-11

## 2020-07-26 ENCOUNTER — E-VISIT (OUTPATIENT)
Dept: FAMILY MEDICINE CLINIC | Facility: CLINIC | Age: 45
End: 2020-07-26

## 2020-07-26 DIAGNOSIS — Z11.59 SCREENING FOR VIRAL DISEASE: Primary | ICD-10-CM

## 2020-07-26 PROCEDURE — 99422 OL DIG E/M SVC 11-20 MIN: CPT | Performed by: PHYSICIAN ASSISTANT

## 2020-07-26 NOTE — PROGRESS NOTES
HPI:  Montse Villalobos is a 54year old male who presents for an evisit. See [employer] above.     Current Outpatient Medications   Medication Sig Dispense Refill   • Pantoprazole Sodium 40 MG Oral Tab EC Take 1 tablet (40 mg total) by mouth ev Coronavirus disease 2019 (COVID-19) is a respiratory illness. It's caused by a new (novel) coronavirus. There are many types of coronavirus. Coronaviruses are a very common cause of colds and bronchitis. They may sometimes cause lung infection (pneumonia). What are possible complications from [id number]-02? In many cases, this virus can cause infection (pneumonia) in both lungs. In some cases, this can cause death. Certain people are at higher risk for complications.  This includes older adults and people with se Your healthcare provider will ask about your symptoms. He or she will ask where you live, and about your recent travel, and any contact with sick people.  If your healthcare provider thinks you may have COVID-19, he or she will consider whether to test you The most proven treatments right now are those to help your body while it fights the virus. This is known as supportive care. Supportive care may include:   · Getting rest.  This helps your body fight the illness. · Staying hydrated.   Drinking liquids is People who have had COVID-19 and are fully recovered may be asked by their healthcare team to consider donating plasma. This is called COVID-19 convalescent plasma donation.  Plasma from people fully recovered from COVID-19 may contain antibodies to help fi

## 2020-07-26 NOTE — PATIENT INSTRUCTIONS
Coronavirus Disease 2019 (COVID-19): Overview  Coronavirus disease 2019 (COVID-19) is a respiratory illness. It's caused by a new (novel) coronavirus. There are many types of coronavirus. Coronaviruses are a very common cause of colds and bronchitis.  The You can check your symptoms with the CDC’s Coronavirus Self-. What are possible complications from [id number]-55? In many cases, this virus can cause infection (pneumonia) in both lungs. In some cases, this can cause death.  Certain people are at highe Your healthcare provider will ask about your symptoms. He or she will ask where you live, and about your recent travel, and any contact with sick people.  If your healthcare provider thinks you may have COVID-19, he or she will consider whether to test you The most proven treatments right now are those to help your body while it fights the virus. This is known as supportive care. Supportive care may include:   · Getting rest.  This helps your body fight the illness. · Staying hydrated.   Drinking liquids is People who have had COVID-19 and are fully recovered may be asked by their healthcare team to consider donating plasma. This is called COVID-19 convalescent plasma donation.  Plasma from people fully recovered from COVID-19 may contain antibodies to help fi

## 2020-08-28 ENCOUNTER — PATIENT MESSAGE (OUTPATIENT)
Dept: FAMILY MEDICINE CLINIC | Facility: CLINIC | Age: 45
End: 2020-08-28

## 2020-08-28 NOTE — TELEPHONE ENCOUNTER
From: Bárbara Costa  To: Ulises Ayala MD  Sent: 8/28/2020 9:52 AM CDT  Subject: Non-Urgent Medical Question    Dr. Tim Kirkland, I am inquiring about the long term effects of my type 2 diabetes.  I know I am pretty much controlling my diabetes now without med

## 2020-10-30 ENCOUNTER — TELEPHONE (OUTPATIENT)
Dept: FAMILY MEDICINE CLINIC | Facility: CLINIC | Age: 45
End: 2020-10-30

## 2020-10-30 DIAGNOSIS — I10 HYPERTENSION, UNSPECIFIED TYPE: ICD-10-CM

## 2020-10-30 DIAGNOSIS — E11.9 TYPE 2 DIABETES MELLITUS WITHOUT COMPLICATION, WITHOUT LONG-TERM CURRENT USE OF INSULIN (HCC): ICD-10-CM

## 2020-10-30 DIAGNOSIS — Z00.00 WELL ADULT EXAM: Primary | ICD-10-CM

## 2020-10-30 NOTE — TELEPHONE ENCOUNTER
Please let patient or caregiver know or leave message that order for his yearly screening and diabetic labs have been placed.   Thanks

## 2020-10-30 NOTE — TELEPHONE ENCOUNTER
Pt had an appt today, 10/30 with Dr Kris Banks. I called to reschedule appt. Pt is requesting his annual blood work be ordered so he can get it done early in the morning. His rescheduled appt is a little later in the day and he doesn't want to fast that late.

## 2020-11-06 ENCOUNTER — LABORATORY ENCOUNTER (OUTPATIENT)
Dept: LAB | Age: 45
End: 2020-11-06
Attending: FAMILY MEDICINE
Payer: COMMERCIAL

## 2020-11-06 DIAGNOSIS — I10 HYPERTENSION, UNSPECIFIED TYPE: ICD-10-CM

## 2020-11-06 DIAGNOSIS — E11.9 TYPE 2 DIABETES MELLITUS WITHOUT COMPLICATION, WITHOUT LONG-TERM CURRENT USE OF INSULIN (HCC): ICD-10-CM

## 2020-11-06 DIAGNOSIS — Z00.00 WELL ADULT EXAM: ICD-10-CM

## 2020-11-06 PROCEDURE — 84450 TRANSFERASE (AST) (SGOT): CPT | Performed by: FAMILY MEDICINE

## 2020-11-06 PROCEDURE — 36415 COLL VENOUS BLD VENIPUNCTURE: CPT | Performed by: FAMILY MEDICINE

## 2020-11-06 PROCEDURE — 82043 UR ALBUMIN QUANTITATIVE: CPT | Performed by: FAMILY MEDICINE

## 2020-11-06 PROCEDURE — 84443 ASSAY THYROID STIM HORMONE: CPT | Performed by: FAMILY MEDICINE

## 2020-11-06 PROCEDURE — 84460 ALANINE AMINO (ALT) (SGPT): CPT | Performed by: FAMILY MEDICINE

## 2020-11-06 PROCEDURE — 84439 ASSAY OF FREE THYROXINE: CPT | Performed by: FAMILY MEDICINE

## 2020-11-06 PROCEDURE — 82570 ASSAY OF URINE CREATININE: CPT | Performed by: FAMILY MEDICINE

## 2020-11-06 PROCEDURE — 85027 COMPLETE CBC AUTOMATED: CPT | Performed by: FAMILY MEDICINE

## 2020-11-06 PROCEDURE — 80048 BASIC METABOLIC PNL TOTAL CA: CPT | Performed by: FAMILY MEDICINE

## 2020-11-06 PROCEDURE — 83036 HEMOGLOBIN GLYCOSYLATED A1C: CPT | Performed by: FAMILY MEDICINE

## 2020-11-06 PROCEDURE — 80061 LIPID PANEL: CPT | Performed by: FAMILY MEDICINE

## 2020-11-11 ENCOUNTER — TELEPHONE (OUTPATIENT)
Dept: FAMILY MEDICINE CLINIC | Facility: CLINIC | Age: 45
End: 2020-11-11

## 2020-11-11 ENCOUNTER — OFFICE VISIT (OUTPATIENT)
Dept: FAMILY MEDICINE CLINIC | Facility: CLINIC | Age: 45
End: 2020-11-11
Payer: COMMERCIAL

## 2020-11-11 ENCOUNTER — LABORATORY ENCOUNTER (OUTPATIENT)
Dept: LAB | Age: 45
End: 2020-11-11
Attending: FAMILY MEDICINE
Payer: COMMERCIAL

## 2020-11-11 VITALS
DIASTOLIC BLOOD PRESSURE: 80 MMHG | BODY MASS INDEX: 31.25 KG/M2 | SYSTOLIC BLOOD PRESSURE: 126 MMHG | HEIGHT: 69 IN | WEIGHT: 211 LBS | OXYGEN SATURATION: 98 % | HEART RATE: 66 BPM | TEMPERATURE: 97 F | RESPIRATION RATE: 17 BRPM

## 2020-11-11 DIAGNOSIS — K21.9 GASTROESOPHAGEAL REFLUX DISEASE, UNSPECIFIED WHETHER ESOPHAGITIS PRESENT: ICD-10-CM

## 2020-11-11 DIAGNOSIS — Z80.42 FAMILY HISTORY OF PROSTATE CANCER: ICD-10-CM

## 2020-11-11 DIAGNOSIS — R07.9 CHEST PAIN, UNSPECIFIED TYPE: ICD-10-CM

## 2020-11-11 DIAGNOSIS — Z00.00 WELL ADULT EXAM: Primary | ICD-10-CM

## 2020-11-11 DIAGNOSIS — R09.89 THROAT TIGHTNESS: ICD-10-CM

## 2020-11-11 DIAGNOSIS — Z00.00 WELL ADULT EXAM: ICD-10-CM

## 2020-11-11 DIAGNOSIS — E78.5 HYPERLIPIDEMIA, UNSPECIFIED HYPERLIPIDEMIA TYPE: ICD-10-CM

## 2020-11-11 PROCEDURE — 99072 ADDL SUPL MATRL&STAF TM PHE: CPT | Performed by: FAMILY MEDICINE

## 2020-11-11 PROCEDURE — 3008F BODY MASS INDEX DOCD: CPT | Performed by: FAMILY MEDICINE

## 2020-11-11 PROCEDURE — 3074F SYST BP LT 130 MM HG: CPT | Performed by: FAMILY MEDICINE

## 2020-11-11 PROCEDURE — 3079F DIAST BP 80-89 MM HG: CPT | Performed by: FAMILY MEDICINE

## 2020-11-11 PROCEDURE — 99396 PREV VISIT EST AGE 40-64: CPT | Performed by: FAMILY MEDICINE

## 2020-11-11 PROCEDURE — 84153 ASSAY OF PSA TOTAL: CPT | Performed by: FAMILY MEDICINE

## 2020-11-11 PROCEDURE — 36415 COLL VENOUS BLD VENIPUNCTURE: CPT | Performed by: FAMILY MEDICINE

## 2020-11-11 RX ORDER — LOSARTAN POTASSIUM 100 MG/1
100 TABLET ORAL DAILY
Qty: 90 TABLET | Refills: 1 | Status: SHIPPED | OUTPATIENT
Start: 2020-11-11 | End: 2021-05-27

## 2020-11-11 RX ORDER — PANTOPRAZOLE SODIUM 40 MG/1
40 TABLET, DELAYED RELEASE ORAL
Qty: 90 TABLET | Refills: 1 | Status: SHIPPED | OUTPATIENT
Start: 2020-11-11 | End: 2020-12-08

## 2020-11-11 RX ORDER — ATORVASTATIN CALCIUM 10 MG/1
10 TABLET, FILM COATED ORAL NIGHTLY
Qty: 90 TABLET | Refills: 0 | Status: SHIPPED | OUTPATIENT
Start: 2020-11-11 | End: 2021-03-05

## 2020-11-11 NOTE — TELEPHONE ENCOUNTER
It is a required test prior to a stress test. It is not because he is sick.  He should do the COVID test about 2-3 days prior to having the stress test.

## 2020-11-11 NOTE — PROGRESS NOTES
Hermann Obando is a 54year old male.     CC:  Patient presents with:  Physical: per pt  Gastro-esophageal Reflux      HPI:  Yearly PX    Last lipid:  Lab Results   Component Value Date    CHOLEST 174 11/06/2020    TRIG 92 11/06/2020    HDL 56 11/06/2020 DAILY  90 tablet 0   • escitalopram (LEXAPRO) 10 MG Oral Tab Take 1 tablet (10 mg total) by mouth daily.  90 tablet 0   • Albuterol Sulfate HFA (VENTOLIN HFA) 108 (90 Base) MCG/ACT Inhalation Aero Soln Inhale 2 puffs into the lungs every 4 (four) hours as n thyromegaly or masses  CAR: S1, S2 normal, RRR; no S3, no S4; no click; murmur negative  PULM: clear to auscultation B, no accessory muscle use  GI: normal active BS+, soft, nondistended; no HSM; no masses; no bruits; no masses; nontender, no G/R/R   [facility] Oral Tab 90 tablet 0     Sig: Take 1 tablet (10 mg total) by mouth nightly. • losartan 100 MG Oral Tab 90 tablet 1     Sig: Take 1 tablet (100 mg total) by mouth daily.    • Pantoprazole Sodium 40 MG Oral Tab EC 90 tablet 1     Sig: Take 1 tablet (40 mg t

## 2020-11-11 NOTE — TELEPHONE ENCOUNTER
Pt would like to talk to nurse about the COVID test that [facility] ordered for him, He would like to know why it was ordered, something to tell his work. Also he would like to know where he goes to do this if he needs too.    Please return call to [phone]

## 2020-11-11 NOTE — TELEPHONE ENCOUNTER
Patient advised of the information per Dr. Shara Bolton. He verbalized understanding of why the test needs to be done. He was advised to not have the test done yet.  He should wait for the approval from insurance on the stress test before getting the COVID test.

## 2020-11-16 ENCOUNTER — MED REC SCAN ONLY (OUTPATIENT)
Dept: FAMILY MEDICINE CLINIC | Facility: CLINIC | Age: 45
End: 2020-11-16

## 2020-11-29 ENCOUNTER — APPOINTMENT (OUTPATIENT)
Dept: LAB | Facility: HOSPITAL | Age: 45
End: 2020-11-29
Attending: FAMILY MEDICINE
Payer: COMMERCIAL

## 2020-11-29 DIAGNOSIS — R07.9 CHEST PAIN, UNSPECIFIED TYPE: ICD-10-CM

## 2020-12-02 ENCOUNTER — HOSPITAL ENCOUNTER (OUTPATIENT)
Dept: CV DIAGNOSTICS | Age: 45
Discharge: HOME | End: 2020-12-02
Attending: FAMILY MEDICINE
Payer: COMMERCIAL

## 2020-12-02 DIAGNOSIS — E78.5 HYPERLIPIDEMIA, UNSPECIFIED HYPERLIPIDEMIA TYPE: ICD-10-CM

## 2020-12-02 DIAGNOSIS — R07.9 CHEST PAIN, UNSPECIFIED TYPE: ICD-10-CM

## 2020-12-02 DIAGNOSIS — R09.89 THROAT TIGHTNESS: ICD-10-CM

## 2020-12-02 PROCEDURE — 93018 CV STRESS TEST I&R ONLY: CPT | Performed by: FAMILY MEDICINE

## 2020-12-02 PROCEDURE — 93017 CV STRESS TEST TRACING ONLY: CPT | Performed by: FAMILY MEDICINE

## 2020-12-02 PROCEDURE — 93350 STRESS TTE ONLY: CPT | Performed by: FAMILY MEDICINE

## 2020-12-03 ENCOUNTER — PATIENT MESSAGE (OUTPATIENT)
Dept: FAMILY MEDICINE CLINIC | Facility: CLINIC | Age: 45
End: 2020-12-03

## 2020-12-08 ENCOUNTER — OFFICE VISIT (OUTPATIENT)
Dept: FAMILY MEDICINE CLINIC | Facility: CLINIC | Age: 45
End: 2020-12-08
Payer: COMMERCIAL

## 2020-12-08 VITALS
DIASTOLIC BLOOD PRESSURE: 86 MMHG | OXYGEN SATURATION: 99 % | SYSTOLIC BLOOD PRESSURE: 124 MMHG | WEIGHT: 215.81 LBS | RESPIRATION RATE: 18 BRPM | TEMPERATURE: 98 F | BODY MASS INDEX: 32 KG/M2 | HEART RATE: 77 BPM

## 2020-12-08 DIAGNOSIS — R00.2 PALPITATION: Primary | ICD-10-CM

## 2020-12-08 DIAGNOSIS — R07.9 CHEST PAIN, UNSPECIFIED TYPE: ICD-10-CM

## 2020-12-08 DIAGNOSIS — R09.89 THROAT TIGHTNESS: ICD-10-CM

## 2020-12-08 DIAGNOSIS — F41.9 ANXIETY: ICD-10-CM

## 2020-12-08 DIAGNOSIS — K21.9 GASTROESOPHAGEAL REFLUX DISEASE, UNSPECIFIED WHETHER ESOPHAGITIS PRESENT: ICD-10-CM

## 2020-12-08 PROCEDURE — 99214 OFFICE O/P EST MOD 30 MIN: CPT | Performed by: FAMILY MEDICINE

## 2020-12-08 PROCEDURE — 3074F SYST BP LT 130 MM HG: CPT | Performed by: FAMILY MEDICINE

## 2020-12-08 PROCEDURE — 3079F DIAST BP 80-89 MM HG: CPT | Performed by: FAMILY MEDICINE

## 2020-12-08 NOTE — PROGRESS NOTES
Erasmo Guadarrama is a 54year old male. CC:  Patient presents with:  Medication Follow-Up: per pt- palpitations  Follow - Up: stress test      HPI:  F/riggins chest tightness. He had a normal stress ECHO on 12/2/2020.  He has noted that when he takes Lexapro dyspnea, lower extremity edema    Vitals: /86   Pulse 77   Temp 97.8 °F (36.6 °C) (Temporal)   Resp 18   Wt 215 lb 12.8 oz (97.9 kg)   SpO2 99%   BMI 31.87 kg/m²    Reviewed by Dimitris Lemos M.D.   Wt Readings from Last 5 Encounters:  12/08/20 : 215 lb Prescriptions      No prescriptions requested or ordered in this encounter         No follow-ups on file.       Authorized by Albino Brewer M.D.

## 2021-01-07 ENCOUNTER — TELEPHONE (OUTPATIENT)
Dept: FAMILY MEDICINE CLINIC | Facility: CLINIC | Age: 46
End: 2021-01-07

## 2021-01-28 ENCOUNTER — PATIENT MESSAGE (OUTPATIENT)
Dept: FAMILY MEDICINE CLINIC | Facility: CLINIC | Age: 46
End: 2021-01-28

## 2021-01-28 NOTE — TELEPHONE ENCOUNTER
From: Adali Nova  To: Carla Ko MD  Sent: 1/28/2021 1:51 PM CST  Subject: Non-Urgent Medical Question    Dr. Bradley Mooney,    This morning I received Phase 1 of the [address] vaccination. It was through the [employer] and my employer.  Pl

## 2021-02-08 ENCOUNTER — TELEPHONE (OUTPATIENT)
Dept: FAMILY MEDICINE CLINIC | Facility: CLINIC | Age: 46
End: 2021-02-08

## 2021-02-08 NOTE — TELEPHONE ENCOUNTER
Emergency Department    [address] [place] [address]    Phone:  [phone]    Fax:  [phone]                                       Ethel De Luna   MRN: [id number]    Department:   Emergency Department   Date of Visit:  1/12/2018           Patient Information     Date Of Birth          1972        Your diagnoses for this visit were:     Calculus of gallbladder without cholecystitis without obstruction        You were seen by Homero Chau MD.      Follow-up Information     Follow up with Jorje Hernandez MD.    Specialty:  Surgery    Why:  avoid fatty greasy food  start otc omeprazole    Contact information:    [address] [address] [phone]          Discharge Instructions         Gallstones with Biliary Colic    You have abdominal pain due to irritation and spasm of the gallbladder. This is called biliary colic. The gallbladder is a small sac under the liver, which stores and releases a fluid that aids in the digestion of fat. A collection of crystals may form stones inside the gallbladder (gallstones). Gallstones can cause the gallbladder to spasm. If they block the duct out of the gallbladder, they can cause pain and even an infection.   A number of factors increase the risk for having gallstones:    Being female    Being severely overweight (obese)    Older age    Losing or gaining weight quickly    Eating a high-calorie diet    Being pregnant    Taking hormone therapy    Having diabetes  Home care    Rest in bed.    Drink only clear liquids until you feel better.    You may have been prescribed medicine for pain or nausea. Take these as directed.    Fat in your diet makes the gallbladder contract and may cause increased pain. Avoid foods that are high in fat (such as full-fat dairy, fried foods, and fatty meats) for at least two days.    If you are overweight, talk to your healthcare provider about losing weight.  Follow-up  Letter mailed to patient reminding him he is due for follow up on diabetes. care  Follow up with your healthcare provider or as advised. There is a chance that you will have another episode of pain from your gallstones at some point. Removal of the gallbladder is an option to prevent this. Talk with your healthcare provider about your treatment options.  When to seek medical advice  Call your healthcare provider if any of the following occur:    Worsening pain or pain lasting for longer than 6 hours    Pain moving to the right lower abdomen    Repeated vomiting    Swollen abdomen    Fever of 100.4 F (38 C) or higher, or as directed by your healthcare provider    Very dark urine, light colored stools, or yellow color of the skin or eyes    Chest, arm, back, neck or jaw pain  Date Last Reviewed: 6/18/2015 2000-2017 [employer]. [address] [place] [address], [place] [address]. All rights reserved. This information is not intended as a substitute for professional medical care. Always follow your healthcare professional's instructions.          24 Hour Appointment Hotline       To make an appointment at any [facility], call [phone] ([phone]). If you don't have a family doctor or clinic, we will help you find one. [facility] clinics are conveniently located to serve the needs of you and your family.             Review of your medicines      Our records show that you are taking the medicines listed below. If these are incorrect, please call your family doctor or clinic.        Dose / Directions Last dose taken    azithromycin 250 MG tablet   Commonly known as:  ZITHROMAX Z-APOLONIA   Quantity:  6 tablet        Two tablets on the first day, then one tablet daily for the next 4 days   Refills:  0        docusate sodium 100 MG capsule   Commonly known as:  COLACE   Dose:  100 mg   Quantity:  60 capsule        Take 1 capsule (100 mg) by mouth 2 times daily   Refills:  1        ferrous sulfate 142 (45 FE) MG Tbcr   Commonly known as:  SLO-FE   Dose:  142 mg        Take 142 mg by  mouth daily   Refills:  0        ibuprofen 400 MG tablet   Commonly known as:  ADVIL/MOTRIN   Dose:  400-800 mg   Quantity:  90 tablet        Take 1-2 tablets (400-800 mg) by mouth every 6 hours as needed for other (cramping)   Refills:  1        oxyCODONE IR 5 MG tablet   Commonly known as:  ROXICODONE   Dose:  5-10 mg   Quantity:  45 tablet        Take 1-2 tablets (5-10 mg) by mouth every 3 hours as needed for moderate to severe pain   Refills:  0        PNV PO        Refills:  0        TYLENOL PO   Dose:  650 mg        Take 650 mg by mouth as needed for mild pain or fever   Refills:  0                Procedures and tests performed during your visit     Abdomen US, limited (RUQ only)    CBC with platelets differential    Comprehensive metabolic panel    D dimer quantitative    EKG 12 lead    HCG qualitative urine    Lipase    Troponin I    UA reflex to Microscopic and Culture    XR Chest 2 Views      Orders Needing Specimen Collection     None      Pending Results     Date and Time Order Name Status Description    1/12/2018 1829 Abdomen US, limited (RUQ only) Preliminary             Pending Culture Results     No orders found from 1/10/2018 to 1/13/2018.            Pending Results Instructions     If you had any lab results that were not finalized at the time of your Discharge, you can call the ED Lab Result RN at [phone]. You will be contacted by this team for any positive Lab results or changes in treatment. The nurses are available 7 days a week from 10A to 6:30P.  You can leave a message 24 hours per day and they will return your call.        Test Results From Your Hospital Stay        1/12/2018  6:50 PM      Component Results     Component Value Ref Range & Units Status    WBC 8.0 4.0 - 11.0 10e9/L Final    RBC Count 4.08 3.8 - 5.2 10e12/L Final    Hemoglobin 10.6 (L) 11.7 - 15.7 g/dL Final    Hematocrit 33.6 (L) 35.0 - 47.0 % Final    MCV 82 78 - 100 fl Final    MCH 26.0 (L) 26.5 - 33.0 pg Final    MCHC  31.5 31.5 - 36.5 g/dL Final    RDW 13.9 10.0 - 15.0 % Final    Platelet Count 303 150 - 450 10e9/L Final    Diff Method Automated Method  Final    % Neutrophils 76.9 % Final    % Lymphocytes 14.2 % Final    % Monocytes 6.6 % Final    % Eosinophils 1.7 % Final    % Basophils 0.4 % Final    % Immature Granulocytes 0.2 % Final    Nucleated RBCs 0 0 /100 Final    Absolute Neutrophil 6.2 1.6 - 8.3 10e9/L Final    Absolute Lymphocytes 1.1 0.8 - 5.3 10e9/L Final    Absolute Monocytes 0.5 0.0 - 1.3 10e9/L Final    Absolute Eosinophils 0.1 0.0 - 0.7 10e9/L Final    Absolute Basophils 0.0 0.0 - 0.2 10e9/L Final    Abs Immature Granulocytes 0.0 0 - 0.4 10e9/L Final    Absolute Nucleated RBC 0.0  Final         1/12/2018  7:10 PM      Component Results     Component Value Ref Range & Units Status    D Dimer <0.3 0.0 - 0.50 ug/ml FEU Final    This D-dimer assay is intended for use in conjunction with a clinical pretest   probability assessment model to exclude pulmonary embolism (PE) and deep   venous thrombosis (DVT) in outpatients suspected of PE or DVT. The cut-off   value is 0.5 ug/mL FEU.           1/12/2018  7:17 PM      Component Results     Component Value Ref Range & Units Status    Sodium 140 133 - 144 mmol/L Final    Potassium 3.7 3.4 - 5.3 mmol/L Final    Chloride 107 94 - 109 mmol/L Final    Carbon Dioxide 28 20 - 32 mmol/L Final    Anion Gap 5 3 - 14 mmol/L Final    Glucose 109 (H) 70 - 99 mg/dL Final    Urea Nitrogen 9 7 - 30 mg/dL Final    Creatinine 0.67 0.52 - 1.04 mg/dL Final    GFR Estimate >90 >60 mL/min/1.7m2 Final    Non  GFR Calc    GFR Estimate If Black >90 >60 mL/min/1.7m2 Final    African American GFR Calc    Calcium 8.4 (L) 8.5 - 10.1 mg/dL Final    Bilirubin Total 0.4 0.2 - 1.3 mg/dL Final    Albumin 3.6 3.4 - 5.0 g/dL Final    Protein Total 7.5 6.8 - 8.8 g/dL Final    Alkaline Phosphatase 157 (H) 40 - 150 U/L Final    ALT 14 0 - 50 U/L Final    AST 14 0 - 45 U/L Final          1/12/2018  7:13 PM      Component Results     Component Value Ref Range & Units Status    Lipase 178 73 - 393 U/L Final         1/12/2018  7:17 PM      Component Results     Component Value Ref Range & Units Status    Troponin I ES <0.015 0.000 - 0.045 ug/L Final    The 99th percentile for upper reference range is 0.045 ug/L.  Troponin values   in the range of 0.045 - 0.120 ug/L may be associated with risks of adverse   clinical events.           1/12/2018  7:16 PM      Component Results     Component Value Ref Range & Units Status    HCG Qual Urine Negative NEG^Negative Final    This test is for screening purposes.  Results should be interpreted along with   the clinical picture.  Confirmation testing is available if warranted by   ordering [id number], HCG Quantitative Pregnancy.           1/12/2018  7:30 PM      Narrative     XR CHEST 2 VW   1/12/2018 7:13 PM     HISTORY: cp;     COMPARISON: Film dated 9/10/2004    FINDINGS: The heart is negative.  The lungs are clear. The pulmonary  vasculature is normal.  The bones and soft tissues are unremarkable.        Impression     IMPRESSION: No active infiltrates are identified.        JESSICA MCLEAN MD         1/12/2018  9:01 PM      Narrative     RIGHT UPPER QUADRANT ULTRASOUND 1/12/2018 8:57 PM    HISTORY:  epig pain, as;     COMPARISON: None.    FINDINGS:    Gallbladder: Gallbladder is not well seen. It is not distended. There  appears to be a nonmultiple, shadowing stone in the neck of the  gallbladder. The wall of the gallbladder appears thickened at 0.6 cm  but the gallbladder appears contracted. Patient was not focally tender  over the gallbladder.    Bile ducts:   CHD is normal diameter.  No intrahepatic biliary  dilatation.    Liver:   Normal.     Pancreas:  Partially obscured by gas. Visualized portions are  negative.    Right kidney:   Normal.         Impression     IMPRESSION:  Gallbladder is poorly seen. It is contracted. There is  echogenic structure in what  appears to be the neck of the gallbladder.  Wall of the gallbladder appears thickened but the gallbladder is not  distended. Patient was not focally tender over the gallbladder. These  findings could be due to cholecystitis but again the gallbladder was  not ideally seen. Correlation with clinical symptoms is suggested.           1/12/2018  7:11 PM      Component Results     Component Value Ref Range & Units Status    Color Urine Yellow  Final    Appearance Urine Clear  Final    Glucose Urine Negative NEG^Negative mg/dL Final    Bilirubin Urine Negative NEG^Negative Final    Ketones Urine Negative NEG^Negative mg/dL Final    Specific Gravity Urine 1.015 1.003 - 1.035 Final    Blood Urine Negative NEG^Negative Final    pH Urine 5.5 5.0 - 7.0 pH Final    Protein Albumin Urine Negative NEG^Negative mg/dL Final    Urobilinogen Urine 0.2 0.2 - 1.0 EU/dL Final    Nitrite Urine Negative NEG^Negative Final    Leukocyte Esterase Urine Negative NEG^Negative Final    Source Midstream Urine  Final                Clinical Quality Measure: Blood Pressure Screening     Your blood pressure was checked while you were in the emergency department today. The last reading we obtained was  BP: 107/68 . Please read the guidelines below about what these numbers mean and what you should do about them.  If your systolic blood pressure (the top number) is less than 120 and your diastolic blood pressure (the bottom number) is less than 80, then your blood pressure is normal. There is nothing more that you need to do about it.  If your systolic blood pressure (the top number) is 120-139 or your diastolic blood pressure (the bottom number) is 80-89, your blood pressure may be higher than it should be. You should have your blood pressure rechecked within a year by a primary care provider.  If your systolic blood pressure (the top number) is 140 or greater or your diastolic blood pressure (the bottom number) is 90 or greater, you may have high  "blood pressure. High blood pressure is treatable, but if left untreated over time it can put you at risk for heart attack, stroke, or kidney failure. You should have your blood pressure rechecked by a primary care provider within the next 4 weeks.  If your provider in the emergency department today gave you specific instructions to follow-up with your doctor or provider even sooner than that, you should follow that instruction and not wait for up to 4 weeks for your follow-up visit.        Thank you for choosing [facility]       Thank you for choosing [facility] for your care. Our goal is always to provide you with excellent care. Hearing back from our patients is one way we can continue to improve our services. Please take a few minutes to complete the written survey that you may receive in the mail after you visit with us. Thank you!        [employer] Information     [employer] lets you send messages to your doctor, view your test results, renew your prescriptions, schedule appointments and more. To sign up, go to [email or web address] . Click on \"Log in\" on the left side of the screen, which will take you to the Welcome page. Then click on \"Sign up Now\" on the right side of the page.     You will be asked to enter the access code listed below, as well as some personal information. Please follow the directions to create your username and password.     Your access code is: [email or web address]  Expires: 2018  9:28 PM     Your access code will  in 90 days. If you need help or a new code, please call your [facility] clinic or [phone].        Care EveryWhere ID     This is your Care EveryWhere ID. This could be used by other organizations to access your [facility] medical records  [id number]        Equal Access to Services     HELEN HAYWOOD : german Cooper qaybta kaalmada adeegyada, waxay idiin hayaan adeeg kharash la'aan ah. So [facility] [phone].    ATENCIÓN: Si habla español, tiene a anna " disposición servicios gratuitos de asistencia lingüística. Dana al [phone].    We comply with applicable federal civil rights laws and [place] laws. We do not discriminate on the basis of race, color, national origin, age, disability, sex, sexual orientation, or gender identity.            After Visit Summary       This is your record. Keep this with you and show to your community pharmacist(s) and doctor(s) at your next visit.

## 2021-03-05 RX ORDER — ATORVASTATIN CALCIUM 10 MG/1
10 TABLET, FILM COATED ORAL NIGHTLY
Qty: 90 TABLET | Refills: 0 | Status: SHIPPED | OUTPATIENT
Start: 2021-03-05 | End: 2021-08-21

## 2021-03-05 NOTE — TELEPHONE ENCOUNTER
LRF 11/11/20 #90  LOV  12/8/20  Future Appointments   Date Time Provider Jose Khan   3/17/2021  8:40 AM Alan Wolff MD [facility] Anurag Michel     Lab Results   Component Value Date    CHOLEST 174 11/06/2020    TRIG 92 11/06/2020    HDL 56 11/06/202

## 2021-03-22 RX ORDER — ESCITALOPRAM OXALATE 10 MG/1
10 TABLET ORAL DAILY
Qty: 90 TABLET | Refills: 0 | Status: SHIPPED | OUTPATIENT
Start: 2021-03-22 | End: 2021-10-13

## 2021-03-22 NOTE — TELEPHONE ENCOUNTER
Protocol: none  Last refilled 4/1/20 #90 with 0 RF  LOV with TJ 12/8/20  Future appt with TJ 4/2/21  Routed to CP due to [facility] being out of office this week

## 2021-04-16 ENCOUNTER — LAB ENCOUNTER (OUTPATIENT)
Dept: LAB | Age: 46
End: 2021-04-16
Attending: FAMILY MEDICINE
Payer: COMMERCIAL

## 2021-04-16 ENCOUNTER — TELEMEDICINE (OUTPATIENT)
Dept: FAMILY MEDICINE CLINIC | Facility: CLINIC | Age: 46
End: 2021-04-16

## 2021-04-16 DIAGNOSIS — R09.81 SINUS CONGESTION: ICD-10-CM

## 2021-04-16 DIAGNOSIS — R05.8 PRODUCTIVE COUGH: Primary | ICD-10-CM

## 2021-04-16 DIAGNOSIS — R05.8 PRODUCTIVE COUGH: ICD-10-CM

## 2021-04-16 PROCEDURE — 99214 OFFICE O/P EST MOD 30 MIN: CPT | Performed by: FAMILY MEDICINE

## 2021-04-16 RX ORDER — AMOXICILLIN AND CLAVULANATE POTASSIUM 500; 125 MG/1; MG/1
TABLET, FILM COATED ORAL
Qty: 20 TABLET | Refills: 0 | Status: SHIPPED | OUTPATIENT
Start: 2021-04-16 | End: 2021-12-01

## 2021-04-16 NOTE — PROGRESS NOTES
My Chart/ Video/Telephone Visit Check-In Due to [address] [place] [address] verbally consents a video Check-In service on 04/16/21.   Patient understands and accepts financial responsibility for any deductible, co-insurance and/or co-pays associated Use      Vaping Use: Never used    Alcohol use: No    Drug use: No       ROS:  General: slightly lower energy  GI: Denies diarrhea     Physical Exam:  General: No apparent distress when conversing with me  Psych: Alert and oriented x 3, speech was not pres communication. This has been done in good jamel to provide continuity of care in the best interest of the provider-patient relationship, due to the ongoing public health crisis/national emergency and because of restrictions of visitation.   There are limit

## 2021-04-23 ENCOUNTER — OFFICE VISIT (OUTPATIENT)
Dept: FAMILY MEDICINE CLINIC | Facility: CLINIC | Age: 46
End: 2021-04-23
Payer: COMMERCIAL

## 2021-04-23 VITALS
HEART RATE: 70 BPM | RESPIRATION RATE: 17 BRPM | SYSTOLIC BLOOD PRESSURE: 132 MMHG | HEIGHT: 69.5 IN | OXYGEN SATURATION: 98 % | TEMPERATURE: 97 F | DIASTOLIC BLOOD PRESSURE: 88 MMHG | BODY MASS INDEX: 31.13 KG/M2 | WEIGHT: 215 LBS

## 2021-04-23 DIAGNOSIS — Z00.00 WELL ADULT EXAM: Primary | ICD-10-CM

## 2021-04-23 DIAGNOSIS — E78.5 HYPERLIPIDEMIA, UNSPECIFIED HYPERLIPIDEMIA TYPE: ICD-10-CM

## 2021-04-23 DIAGNOSIS — I10 HYPERTENSION, UNSPECIFIED TYPE: ICD-10-CM

## 2021-04-23 DIAGNOSIS — F41.9 ANXIETY: ICD-10-CM

## 2021-04-23 DIAGNOSIS — E11.9 TYPE 2 DIABETES MELLITUS WITHOUT COMPLICATION, WITHOUT LONG-TERM CURRENT USE OF INSULIN (HCC): ICD-10-CM

## 2021-04-23 PROCEDURE — 83036 HEMOGLOBIN GLYCOSYLATED A1C: CPT | Performed by: FAMILY MEDICINE

## 2021-04-23 PROCEDURE — 3008F BODY MASS INDEX DOCD: CPT | Performed by: FAMILY MEDICINE

## 2021-04-23 PROCEDURE — 3079F DIAST BP 80-89 MM HG: CPT | Performed by: FAMILY MEDICINE

## 2021-04-23 PROCEDURE — 84450 TRANSFERASE (AST) (SGOT): CPT | Performed by: FAMILY MEDICINE

## 2021-04-23 PROCEDURE — 80061 LIPID PANEL: CPT | Performed by: FAMILY MEDICINE

## 2021-04-23 PROCEDURE — 3075F SYST BP GE 130 - 139MM HG: CPT | Performed by: FAMILY MEDICINE

## 2021-04-23 PROCEDURE — 3051F HG A1C>EQUAL 7.0%<8.0%: CPT | Performed by: FAMILY MEDICINE

## 2021-04-23 PROCEDURE — 84460 ALANINE AMINO (ALT) (SGPT): CPT | Performed by: FAMILY MEDICINE

## 2021-04-23 PROCEDURE — 99396 PREV VISIT EST AGE 40-64: CPT | Performed by: FAMILY MEDICINE

## 2021-04-23 NOTE — PROGRESS NOTES
Kathy Ordonez is a 51year old male.     CC:  CPX    HPI:  Yearly PX    Last lipid:  Lab Results   Component Value Date    CHOLEST 174 11/06/2020    TRIG 92 11/06/2020    HDL 56 11/06/2020     (H) 11/06/2020    VLDL 18 11/06/2020    NONHDLC 118 11 tablet 0   • escitalopram (LEXAPRO) 10 MG Oral Tab Take 1 tablet (10 mg total) by mouth daily. 90 tablet 0   • atorvastatin (LIPITOR) 10 MG Oral Tab Take 1 tablet (10 mg total) by mouth nightly.  90 tablet 0   • losartan 100 MG Oral Tab Take 1 tablet (100 m conjunctiva and lids normal  HENT: B pinnas, external auditory canals and tympanic membranes are normal.   NECK: No lymphadenopathy, thyromegaly or masses  CAR: S1, S2 normal, RRR; no S3, no S4; no click; murmur negative  PULM: clear to auscultation B, no Prescriptions      No prescriptions requested or ordered in this encounter         No follow-ups on file.       Authorized by Genie Rooney M.D.

## 2021-05-04 ENCOUNTER — TELEPHONE (OUTPATIENT)
Dept: FAMILY MEDICINE CLINIC | Facility: CLINIC | Age: 46
End: 2021-05-04

## 2021-05-04 NOTE — TELEPHONE ENCOUNTER
Diabetic Eye Exam report for [facility]. Results entered into flow sheet. Copy sent to scanning.

## 2021-05-27 RX ORDER — LOSARTAN POTASSIUM 100 MG/1
100 TABLET ORAL DAILY
Qty: 90 TABLET | Refills: 2 | Status: SHIPPED | OUTPATIENT
Start: 2021-05-27

## 2021-08-23 RX ORDER — ATORVASTATIN CALCIUM 10 MG/1
10 TABLET, FILM COATED ORAL NIGHTLY
Qty: 90 TABLET | Refills: 0 | Status: SHIPPED | OUTPATIENT
Start: 2021-08-23

## 2021-08-23 NOTE — TELEPHONE ENCOUNTER
Cholesterol Medication Protocol [id number]/21/2021 06:22 PM   ALT < 80 Protocol Details    ALT resulted within past year     Lipid panel within past 12 months     Appointment within past 12 or next 3 months      Refilled per protocol and sent to pharmacy.

## 2021-10-14 RX ORDER — ESCITALOPRAM OXALATE 10 MG/1
10 TABLET ORAL DAILY
Qty: 90 TABLET | Refills: 2 | Status: SHIPPED | OUTPATIENT
Start: 2021-10-14

## 2021-10-14 NOTE — TELEPHONE ENCOUNTER
LOV: 4/23/21   Last Refill: 3/22/21 #90 0 RF    Future Appointments   Date Time Provider Jose Khan   11/12/2021  8:00 AM Aime Byrd MD [facility] IGGY Tran

## 2021-12-01 ENCOUNTER — OFFICE VISIT (OUTPATIENT)
Dept: FAMILY MEDICINE CLINIC | Facility: CLINIC | Age: 46
End: 2021-12-01
Payer: COMMERCIAL

## 2021-12-01 VITALS
WEIGHT: 214 LBS | BODY MASS INDEX: 31 KG/M2 | SYSTOLIC BLOOD PRESSURE: 130 MMHG | DIASTOLIC BLOOD PRESSURE: 84 MMHG | HEART RATE: 72 BPM | OXYGEN SATURATION: 98 % | TEMPERATURE: 98 F

## 2021-12-01 DIAGNOSIS — Z80.42 FAMILY HISTORY OF PROSTATE CANCER: ICD-10-CM

## 2021-12-01 DIAGNOSIS — Z12.5 PROSTATE CANCER SCREENING: ICD-10-CM

## 2021-12-01 DIAGNOSIS — L72.3 SEBACEOUS CYST: Primary | ICD-10-CM

## 2021-12-01 DIAGNOSIS — E11.9 TYPE 2 DIABETES MELLITUS WITHOUT COMPLICATION, WITHOUT LONG-TERM CURRENT USE OF INSULIN (HCC): ICD-10-CM

## 2021-12-01 DIAGNOSIS — Z71.85 IMMUNIZATION COUNSELING: ICD-10-CM

## 2021-12-01 DIAGNOSIS — I10 HYPERTENSION, UNSPECIFIED TYPE: ICD-10-CM

## 2021-12-01 DIAGNOSIS — E78.5 HYPERLIPIDEMIA, UNSPECIFIED HYPERLIPIDEMIA TYPE: ICD-10-CM

## 2021-12-01 DIAGNOSIS — Z72.0 CHEWING TOBACCO USE: ICD-10-CM

## 2021-12-01 PROCEDURE — 82043 UR ALBUMIN QUANTITATIVE: CPT | Performed by: FAMILY MEDICINE

## 2021-12-01 PROCEDURE — 84450 TRANSFERASE (AST) (SGOT): CPT | Performed by: FAMILY MEDICINE

## 2021-12-01 PROCEDURE — 84153 ASSAY OF PSA TOTAL: CPT | Performed by: FAMILY MEDICINE

## 2021-12-01 PROCEDURE — 84443 ASSAY THYROID STIM HORMONE: CPT | Performed by: FAMILY MEDICINE

## 2021-12-01 PROCEDURE — 99215 OFFICE O/P EST HI 40 MIN: CPT | Performed by: FAMILY MEDICINE

## 2021-12-01 PROCEDURE — 3079F DIAST BP 80-89 MM HG: CPT | Performed by: FAMILY MEDICINE

## 2021-12-01 PROCEDURE — 85027 COMPLETE CBC AUTOMATED: CPT | Performed by: FAMILY MEDICINE

## 2021-12-01 PROCEDURE — 82570 ASSAY OF URINE CREATININE: CPT | Performed by: FAMILY MEDICINE

## 2021-12-01 PROCEDURE — 83036 HEMOGLOBIN GLYCOSYLATED A1C: CPT | Performed by: FAMILY MEDICINE

## 2021-12-01 PROCEDURE — 3075F SYST BP GE 130 - 139MM HG: CPT | Performed by: FAMILY MEDICINE

## 2021-12-01 PROCEDURE — 84460 ALANINE AMINO (ALT) (SGPT): CPT | Performed by: FAMILY MEDICINE

## 2021-12-01 PROCEDURE — 80061 LIPID PANEL: CPT | Performed by: FAMILY MEDICINE

## 2021-12-01 PROCEDURE — 80048 BASIC METABOLIC PNL TOTAL CA: CPT | Performed by: FAMILY MEDICINE

## 2021-12-01 NOTE — PROGRESS NOTES
Raegan Niece is a 51year old male. CC:  DM.  HLD f/u    HPI:  Here to follow up diabetes  Home sugar readings: none reviewed  Insulin use: none  Diet compliance: poor  Exercise: not regular  Extremity pain/numbness: none  Chest pain: none    Lab Res Medications   Medication Sig Dispense Refill   • escitalopram (LEXAPRO) 10 MG Oral Tab Take 1 tablet (10 mg total) by mouth daily. 90 tablet 2   • atorvastatin (LIPITOR) 10 MG Oral Tab Take 1 tablet (10 mg total) by mouth nightly.  90 tablet 0   • LOSARTAN RRR; no S3, no S4; no click; murmur negative  PULM: clear to auscultation B, no accessory muscle use  GI: not examined  PSYCH: alert and oriented x 3; affect appropriate  SKIN: area over the R proximal clavicle with a small cystic structure that is mobil a Standing Status: Future          Number of Occurrences: 1          Standing Expiration Date: 12/1/2022      AST (SGOT)          Standing Status: Future          Number of Occurrences: 1          Standing Expiration Date: 12/1/2022      Basic Metabolic Pane results: 0 minutes  Communication of results to patient/family/caregiver: 0 minutes  Care coordination: 0 minutes       Authorized by Kierra Arzola M.D.

## 2022-01-21 ENCOUNTER — HOSPITAL ENCOUNTER (OUTPATIENT)
Age: 47
Discharge: HOME | End: 2022-01-21
Payer: COMMERCIAL

## 2022-01-21 VITALS
OXYGEN SATURATION: 97 % | DIASTOLIC BLOOD PRESSURE: 113 MMHG | HEART RATE: 89 BPM | HEIGHT: 70 IN | WEIGHT: 214 LBS | BODY MASS INDEX: 30.64 KG/M2 | TEMPERATURE: 98 F | SYSTOLIC BLOOD PRESSURE: 162 MMHG | RESPIRATION RATE: 16 BRPM

## 2022-01-21 DIAGNOSIS — R68.83 CHILLS: Primary | ICD-10-CM

## 2022-01-21 DIAGNOSIS — J11.1 INFLUENZA-LIKE ILLNESS: ICD-10-CM

## 2022-01-21 LAB — SARS-COV-2 RNA RESP QL NAA+PROBE: NOT DETECTED

## 2022-01-21 PROCEDURE — 99213 OFFICE O/P EST LOW 20 MIN: CPT | Performed by: NURSE PRACTITIONER

## 2022-01-21 PROCEDURE — U0002 COVID-19 LAB TEST NON-CDC: HCPCS | Performed by: NURSE PRACTITIONER

## 2022-01-21 NOTE — ED PROVIDER NOTES
Patient Seen in: [facility]      History   Patient presents with:  Sore Throat  Body ache and/or chills    Stated Complaint: headache sore throat with mild body aches /sinus pain     Subjective:   25-year-old male presents to the immediate car canal normal.      Nose: Nose normal.      Mouth/Throat:      Mouth: Mucous membranes are moist.      Tonsils: No tonsillar exudate. Eyes:      Extraocular Movements: Extraocular movements intact.       Conjunctiva/sclera: Conjunctivae normal.   [employer]

## 2022-02-28 ENCOUNTER — PATIENT MESSAGE (OUTPATIENT)
Dept: FAMILY MEDICINE CLINIC | Facility: CLINIC | Age: 47
End: 2022-02-28

## 2022-02-28 RX ORDER — ATORVASTATIN CALCIUM 10 MG/1
10 TABLET, FILM COATED ORAL NIGHTLY
Qty: 90 TABLET | Refills: 0 | Status: SHIPPED | OUTPATIENT
Start: 2022-02-28

## 2022-02-28 RX ORDER — ESCITALOPRAM OXALATE 10 MG/1
10 TABLET ORAL DAILY
Qty: 90 TABLET | Refills: 1 | Status: SHIPPED | OUTPATIENT
Start: 2022-02-28

## 2022-02-28 RX ORDER — PANTOPRAZOLE SODIUM 40 MG/1
40 TABLET, DELAYED RELEASE ORAL
Qty: 90 TABLET | Refills: 1 | Status: SHIPPED | OUTPATIENT
Start: 2022-02-28

## 2022-02-28 NOTE — TELEPHONE ENCOUNTER
Cholesterol Medication Protocol Passed 02/28/2022 10:33 AM   Protocol Details  ALT < 80    ALT resulted within past year    Lipid panel within past 12 months    Appointment within past 12 or next 3 months        Refilled per protocol and sent to pharmacy. Routing to Dr. Nathalie Chavez for review of Escitalopram. Please advise. Thank you.

## 2022-02-28 NOTE — TELEPHONE ENCOUNTER
From: Alex Pride  To: Avril Babb MD  Sent: 2/28/2022 10:38 AM CST  Subject: Prescription refill    I am writing to request a refill on Pantoprazole Sodium 40 MG Oral Tab EC. I know it's been awhile since I requested this. Please let me know if you need anything from me.  Thank you    Grace Lopez

## 2022-05-16 RX ORDER — LOSARTAN POTASSIUM 100 MG/1
100 TABLET ORAL DAILY
Qty: 90 TABLET | Refills: 0 | Status: SHIPPED | OUTPATIENT
Start: 2022-05-16

## 2022-08-16 ENCOUNTER — PATIENT MESSAGE (OUTPATIENT)
Dept: FAMILY MEDICINE CLINIC | Facility: CLINIC | Age: 47
End: 2022-08-16

## 2022-08-16 DIAGNOSIS — R50.9 FEVER, UNSPECIFIED FEVER CAUSE: ICD-10-CM

## 2022-08-16 DIAGNOSIS — R05.1 ACUTE COUGH: Primary | ICD-10-CM

## 2022-08-16 DIAGNOSIS — J02.9 SORE THROAT: ICD-10-CM

## 2022-08-16 NOTE — TELEPHONE ENCOUNTER
From: Annamarie Barrera  To: Stephen Mack MD  Sent: 8/16/2022 11:50 AM CDT  Subject: Covid testing    Dr. Pamella Stuart,    Early yesterday I began experiencing cold symptoms (sore throat, sneezing, runny nose). I had a fever of 101 for about 6 hours as it broke during the night last night. My throat is still raw and painful and I have head congestion with no fever but I am achy. My question is this:    Last night after 12 hours of symptoms I took the home Covid test with negative results, however I am receiving conflicting advice on how long into the symptoms I should take the test. I understand my supervisor's concern for making sure I do not have Covid when I return to work but I would like your opinion if I should let the symptoms \"cook\" longer before getting an accurate test or if you feel the at home tests are not too dependable. I appreciate your time you're taking for any advice. Or, do I need to just come see you?     Doug Magaña

## 2022-08-17 ENCOUNTER — HOSPITAL ENCOUNTER (OUTPATIENT)
Age: 47
Discharge: HOME | End: 2022-08-17
Payer: COMMERCIAL

## 2022-08-17 VITALS
RESPIRATION RATE: 18 BRPM | HEIGHT: 70 IN | SYSTOLIC BLOOD PRESSURE: 141 MMHG | OXYGEN SATURATION: 97 % | WEIGHT: 215 LBS | BODY MASS INDEX: 30.78 KG/M2 | DIASTOLIC BLOOD PRESSURE: 106 MMHG | HEART RATE: 69 BPM | TEMPERATURE: 97 F

## 2022-08-17 DIAGNOSIS — J02.0 STREP PHARYNGITIS: Primary | ICD-10-CM

## 2022-08-17 LAB
S PYO AG THROAT QL: POSITIVE
SARS-COV-2 RNA RESP QL NAA+PROBE: NOT DETECTED

## 2022-08-17 PROCEDURE — U0002 COVID-19 LAB TEST NON-CDC: HCPCS | Performed by: NURSE PRACTITIONER

## 2022-08-17 PROCEDURE — 87880 STREP A ASSAY W/OPTIC: CPT | Performed by: NURSE PRACTITIONER

## 2022-08-17 PROCEDURE — 99213 OFFICE O/P EST LOW 20 MIN: CPT | Performed by: NURSE PRACTITIONER

## 2022-08-17 RX ORDER — LIDOCAINE HYDROCHLORIDE 20 MG/ML
5 SOLUTION OROPHARYNGEAL
Qty: 100 ML | Refills: 0 | Status: SHIPPED | OUTPATIENT
Start: 2022-08-17

## 2022-08-17 RX ORDER — AMOXICILLIN 875 MG/1
875 TABLET, COATED ORAL 2 TIMES DAILY
Qty: 20 TABLET | Refills: 0 | Status: SHIPPED | OUTPATIENT
Start: 2022-08-17 | End: 2022-08-27

## 2022-09-17 NOTE — TELEPHONE ENCOUNTER
Last refilled 5/16/22 for #90 with 0 RF  LOV with TJ 12/1/21  No future appt with pcp  Last BMP 12/1/21    Hypertension Medications Protocol Failed 09/16/2022 08:57 PM   Protocol Details  Appointment in past 6 or next 3 months    CMP or BMP in past 12 months    Last serum creatinine< 2.0

## 2022-09-19 RX ORDER — LOSARTAN POTASSIUM 100 MG/1
100 TABLET ORAL DAILY
Qty: 90 TABLET | Refills: 0 | Status: SHIPPED | OUTPATIENT
Start: 2022-09-19

## 2022-11-17 RX ORDER — ATORVASTATIN CALCIUM 10 MG/1
10 TABLET, FILM COATED ORAL NIGHTLY
Qty: 90 TABLET | Refills: 0 | Status: SHIPPED | OUTPATIENT
Start: 2022-11-17

## 2022-11-23 ENCOUNTER — OFFICE VISIT (OUTPATIENT)
Dept: FAMILY MEDICINE CLINIC | Facility: CLINIC | Age: 47
End: 2022-11-23
Payer: COMMERCIAL

## 2022-11-23 VITALS
WEIGHT: 211.38 LBS | SYSTOLIC BLOOD PRESSURE: 138 MMHG | TEMPERATURE: 97 F | HEART RATE: 89 BPM | BODY MASS INDEX: 30 KG/M2 | DIASTOLIC BLOOD PRESSURE: 89 MMHG | OXYGEN SATURATION: 97 %

## 2022-11-23 DIAGNOSIS — E11.9 TYPE 2 DIABETES MELLITUS WITHOUT COMPLICATION, WITHOUT LONG-TERM CURRENT USE OF INSULIN (HCC): ICD-10-CM

## 2022-11-23 DIAGNOSIS — J06.9 VIRAL UPPER RESPIRATORY TRACT INFECTION: ICD-10-CM

## 2022-11-23 DIAGNOSIS — I10 HYPERTENSION, UNSPECIFIED TYPE: Primary | ICD-10-CM

## 2022-11-23 DIAGNOSIS — Z63.0 STRESS DUE TO MARITAL PROBLEMS: ICD-10-CM

## 2022-11-23 LAB
CREAT UR-SCNC: 281 MG/DL
MICROALBUMIN UR-MCNC: 8.52 MG/DL
MICROALBUMIN/CREAT 24H UR-RTO: 30.3 UG/MG (ref ?–30)

## 2022-11-23 PROCEDURE — 82570 ASSAY OF URINE CREATININE: CPT | Performed by: FAMILY MEDICINE

## 2022-11-23 PROCEDURE — 82043 UR ALBUMIN QUANTITATIVE: CPT | Performed by: FAMILY MEDICINE

## 2022-11-23 PROCEDURE — 99215 OFFICE O/P EST HI 40 MIN: CPT | Performed by: FAMILY MEDICINE

## 2022-11-23 PROCEDURE — 83036 HEMOGLOBIN GLYCOSYLATED A1C: CPT | Performed by: FAMILY MEDICINE

## 2022-11-23 PROCEDURE — 3075F SYST BP GE 130 - 139MM HG: CPT | Performed by: FAMILY MEDICINE

## 2022-11-23 PROCEDURE — 3079F DIAST BP 80-89 MM HG: CPT | Performed by: FAMILY MEDICINE

## 2022-11-23 RX ORDER — ZOLPIDEM TARTRATE 10 MG/1
10 TABLET ORAL NIGHTLY
Qty: 30 TABLET | Refills: 0 | Status: SHIPPED | OUTPATIENT
Start: 2022-11-23

## 2022-11-23 RX ORDER — ESCITALOPRAM OXALATE 20 MG/1
20 TABLET ORAL DAILY
Qty: 90 TABLET | Refills: 0 | Status: SHIPPED | OUTPATIENT
Start: 2022-11-23

## 2022-11-23 SDOH — SOCIAL STABILITY - SOCIAL INSECURITY: PROBLEMS IN RELATIONSHIP WITH SPOUSE OR PARTNER: Z63.0

## 2022-11-24 LAB
EST. AVERAGE GLUCOSE BLD GHB EST-MCNC: 174 MG/DL (ref 68–126)
HBA1C MFR BLD: 7.7 % (ref ?–5.7)

## 2023-01-05 RX ORDER — LOSARTAN POTASSIUM 100 MG/1
100 TABLET ORAL DAILY
Qty: 90 TABLET | Refills: 1 | Status: CANCELLED | OUTPATIENT
Start: 2023-01-05

## 2023-01-24 ENCOUNTER — PATIENT MESSAGE (OUTPATIENT)
Dept: FAMILY MEDICINE CLINIC | Facility: CLINIC | Age: 48
End: 2023-01-24

## 2023-01-24 ENCOUNTER — OFFICE VISIT (OUTPATIENT)
Dept: FAMILY MEDICINE CLINIC | Facility: CLINIC | Age: 48
End: 2023-01-24
Payer: COMMERCIAL

## 2023-01-24 VITALS
RESPIRATION RATE: 16 BRPM | HEART RATE: 91 BPM | BODY MASS INDEX: 30.89 KG/M2 | DIASTOLIC BLOOD PRESSURE: 88 MMHG | HEIGHT: 69.5 IN | TEMPERATURE: 97 F | OXYGEN SATURATION: 97 % | WEIGHT: 213.38 LBS | SYSTOLIC BLOOD PRESSURE: 134 MMHG

## 2023-01-24 DIAGNOSIS — Z12.5 PROSTATE CANCER SCREENING: ICD-10-CM

## 2023-01-24 DIAGNOSIS — E11.9 TYPE 2 DIABETES MELLITUS WITHOUT COMPLICATION, WITHOUT LONG-TERM CURRENT USE OF INSULIN (HCC): ICD-10-CM

## 2023-01-24 DIAGNOSIS — L98.9 FACE LESION: ICD-10-CM

## 2023-01-24 DIAGNOSIS — E78.5 HYPERLIPIDEMIA, UNSPECIFIED HYPERLIPIDEMIA TYPE: ICD-10-CM

## 2023-01-24 DIAGNOSIS — I10 HYPERTENSION, UNSPECIFIED TYPE: ICD-10-CM

## 2023-01-24 DIAGNOSIS — Z00.00 WELL ADULT EXAM: Primary | ICD-10-CM

## 2023-01-24 DIAGNOSIS — R80.9 MICROALBUMINURIA: ICD-10-CM

## 2023-01-24 DIAGNOSIS — Z72.0 CHEWING TOBACCO USE: ICD-10-CM

## 2023-01-24 DIAGNOSIS — K21.9 GASTROESOPHAGEAL REFLUX DISEASE, UNSPECIFIED WHETHER ESOPHAGITIS PRESENT: ICD-10-CM

## 2023-01-24 LAB
ALT SERPL-CCNC: 46 U/L
ANION GAP SERPL CALC-SCNC: 11 MMOL/L (ref 0–18)
AST SERPL-CCNC: 25 U/L (ref 15–37)
BUN BLD-MCNC: 16 MG/DL (ref 7–18)
CALCIUM BLD-MCNC: 10 MG/DL (ref 8.5–10.1)
CHLORIDE SERPL-SCNC: 104 MMOL/L (ref 98–112)
CHOLEST SERPL-MCNC: 186 MG/DL (ref ?–200)
CO2 SERPL-SCNC: 24 MMOL/L (ref 21–32)
COMPLEXED PSA SERPL-MCNC: 1.87 NG/ML (ref ?–4)
CREAT BLD-MCNC: 1.35 MG/DL
ERYTHROCYTE [DISTWIDTH] IN BLOOD BY AUTOMATED COUNT: 13.6 %
FASTING PATIENT LIPID ANSWER: YES
FASTING STATUS PATIENT QL REPORTED: YES
GFR SERPLBLD BASED ON 1.73 SQ M-ARVRAT: 65 ML/MIN/1.73M2 (ref 60–?)
GLUCOSE BLD-MCNC: 162 MG/DL (ref 70–99)
HCT VFR BLD AUTO: 53.3 %
HDLC SERPL-MCNC: 61 MG/DL (ref 40–59)
HGB BLD-MCNC: 18.1 G/DL
LDLC SERPL CALC-MCNC: 97 MG/DL (ref ?–100)
MCH RBC QN AUTO: 30.6 PG (ref 26–34)
MCHC RBC AUTO-ENTMCNC: 34 G/DL (ref 31–37)
MCV RBC AUTO: 90.2 FL
NONHDLC SERPL-MCNC: 125 MG/DL (ref ?–130)
OSMOLALITY SERPL CALC.SUM OF ELEC: 293 MOSM/KG (ref 275–295)
PLATELET # BLD AUTO: 291 10(3)UL (ref 150–450)
POTASSIUM SERPL-SCNC: 4.3 MMOL/L (ref 3.5–5.1)
RBC # BLD AUTO: 5.91 X10(6)UL
SODIUM SERPL-SCNC: 139 MMOL/L (ref 136–145)
TRIGL SERPL-MCNC: 162 MG/DL (ref 30–149)
VLDLC SERPL CALC-MCNC: 27 MG/DL (ref 0–30)
WBC # BLD AUTO: 8.4 X10(3) UL (ref 4–11)

## 2023-01-24 PROCEDURE — 80061 LIPID PANEL: CPT | Performed by: FAMILY MEDICINE

## 2023-01-24 PROCEDURE — 99396 PREV VISIT EST AGE 40-64: CPT | Performed by: FAMILY MEDICINE

## 2023-01-24 PROCEDURE — 84460 ALANINE AMINO (ALT) (SGPT): CPT | Performed by: FAMILY MEDICINE

## 2023-01-24 PROCEDURE — 3075F SYST BP GE 130 - 139MM HG: CPT | Performed by: FAMILY MEDICINE

## 2023-01-24 PROCEDURE — 3079F DIAST BP 80-89 MM HG: CPT | Performed by: FAMILY MEDICINE

## 2023-01-24 PROCEDURE — 3008F BODY MASS INDEX DOCD: CPT | Performed by: FAMILY MEDICINE

## 2023-01-24 PROCEDURE — 85027 COMPLETE CBC AUTOMATED: CPT | Performed by: FAMILY MEDICINE

## 2023-01-24 PROCEDURE — G0103 PSA SCREENING: HCPCS | Performed by: FAMILY MEDICINE

## 2023-01-24 PROCEDURE — 80048 BASIC METABOLIC PNL TOTAL CA: CPT | Performed by: FAMILY MEDICINE

## 2023-01-24 PROCEDURE — 84450 TRANSFERASE (AST) (SGOT): CPT | Performed by: FAMILY MEDICINE

## 2023-01-24 PROCEDURE — 90715 TDAP VACCINE 7 YRS/> IM: CPT | Performed by: FAMILY MEDICINE

## 2023-01-24 PROCEDURE — 90471 IMMUNIZATION ADMIN: CPT | Performed by: FAMILY MEDICINE

## 2023-01-24 RX ORDER — BLOOD-GLUCOSE SENSOR
EACH MISCELLANEOUS
Qty: 10 EACH | Refills: 0 | Status: SHIPPED | OUTPATIENT
Start: 2023-01-24

## 2023-01-25 NOTE — TELEPHONE ENCOUNTER
From: Darlene Halsted  To: Felice Cedeño MD  Sent: 1/24/2023 5:16 PM CST  Subject: I know    Dr. Tse Neighbors,    I'm getting to you before you get to me. I reviewed and the Glucose is unacceptable (a joke) along with high triglycerides & high creatinine, among others. I know the importance of lowering these numbers. If we can wait until my A1C results in March to put me on Metformin (sp?), I would like that. However, if you feel its necessary now I'm game for that also. Thank you for your time and help today.     Kt Loza

## 2023-01-27 ENCOUNTER — MED REC SCAN ONLY (OUTPATIENT)
Dept: FAMILY MEDICINE CLINIC | Facility: CLINIC | Age: 48
End: 2023-01-27

## 2023-01-27 ENCOUNTER — TELEPHONE (OUTPATIENT)
Dept: FAMILY MEDICINE CLINIC | Facility: CLINIC | Age: 48
End: 2023-01-27

## 2023-01-27 NOTE — TELEPHONE ENCOUNTER
PA for dexcom denied  Case ID: [id number]  Per [employer] FEPdoesn't meet criteria for the CGM-  Not insulin dependrnt, or insulin pump    Left message for the pt that the device was denied and if questions to call

## 2023-02-02 ENCOUNTER — TELEPHONE (OUTPATIENT)
Dept: FAMILY MEDICINE CLINIC | Facility: CLINIC | Age: 48
End: 2023-02-02

## 2023-02-02 NOTE — TELEPHONE ENCOUNTER
Spoke to [facility] program and patient doesn't meet the criteria for the Dexcom G6 Sensor. Spoke to [place] to inform them that the PA was denied.   Left message detail message per (HIPPA form) that patient's insurance denied Dexcom

## 2023-02-03 ENCOUNTER — MED REC SCAN ONLY (OUTPATIENT)
Dept: FAMILY MEDICINE CLINIC | Facility: CLINIC | Age: 48
End: 2023-02-03

## 2023-03-28 ENCOUNTER — PATIENT MESSAGE (OUTPATIENT)
Dept: FAMILY MEDICINE CLINIC | Facility: CLINIC | Age: 48
End: 2023-03-28

## 2023-03-29 NOTE — TELEPHONE ENCOUNTER
From: Nicole Tavares  To: Violetta Francois MD  Sent: 3/28/2023 9:25 PM CDT  Subject: COVID positive    After experiencing a cold, cough and lightheadedness, I tested myself for COVID and the test was positive. I feel pretty typical for a usual cold but the lightheadedness and minor eye focus issues drove me to home test. Is the lightheadedness a concern? ? I'm in no way bedridden or losing my balance.  Thanks    Fatoumata Gomez

## 2023-08-16 ENCOUNTER — PATIENT MESSAGE (OUTPATIENT)
Dept: FAMILY MEDICINE CLINIC | Facility: CLINIC | Age: 48
End: 2023-08-16

## 2023-08-21 NOTE — TELEPHONE ENCOUNTER
From: Wilma Santa  To: Jarod Corral MD  Sent: 8/16/2023 12:23 PM CDT  Subject: GI issues    Doc Chris,    I have been having stomach cramps/pains and frequent bowel movements off and on (more on than off) the past 3 weeks. On top of that I had poison Ivy (diagnosed in [place], [place] with a steroid injection that didn't help much) starting 3 weeks ago. Poison ivy clearing up though with few spots left. I want to check with you first before I entertain scheduling an appointment with my GI. Unfortunately I am also past due on my A1C test. Do you want to see me first (next appt for you is 8/25) or schedule with GI and then see you afterward? ?    Much appreciation,    [facility]

## 2023-12-28 ENCOUNTER — PATIENT MESSAGE (OUTPATIENT)
Dept: FAMILY MEDICINE CLINIC | Facility: CLINIC | Age: 48
End: 2023-12-28

## 2023-12-28 DIAGNOSIS — D58.2 ELEVATED HEMOGLOBIN (HCC): Primary | ICD-10-CM

## 2024-01-04 RX ORDER — PANTOPRAZOLE SODIUM 40 MG/1
40 TABLET, DELAYED RELEASE ORAL
Qty: 90 TABLET | Refills: 1 | Status: SHIPPED | OUTPATIENT
Start: 2024-01-04

## 2024-01-04 NOTE — TELEPHONE ENCOUNTER
Please let patient or caregiver know or leave message that refill request for the medication/s listed below has/have come to our office. The refills have been sent.    Keep appt in 2/2024    It appears he is due for colon cancer screen. This can be accomplished via FIT, Cologuard or Colonoscopy testing. If desires colonoscopy then he can see [facility] Tel: [phone]     Thanks     Requested Prescriptions     Signed Prescriptions Disp Refills    pantoprazole 40 MG Oral Tab EC 90 tablet 1     Sig: Take 1 tablet (40 mg total) by mouth every morning before breakfast.     Authorizing Provider: CHERIE TERRY

## 2024-01-16 ENCOUNTER — HOSPITAL ENCOUNTER (OUTPATIENT)
Age: 49
Discharge: HOME | End: 2024-01-16
Payer: COMMERCIAL

## 2024-01-16 VITALS
OXYGEN SATURATION: 97 % | HEART RATE: 86 BPM | TEMPERATURE: 97 F | HEIGHT: 70 IN | RESPIRATION RATE: 18 BRPM | WEIGHT: 212 LBS | BODY MASS INDEX: 30.35 KG/M2

## 2024-01-16 DIAGNOSIS — Z20.818 EXPOSURE TO STREP THROAT: Primary | ICD-10-CM

## 2024-01-16 DIAGNOSIS — J02.9 SORE THROAT: ICD-10-CM

## 2024-01-16 LAB
S PYO AG THROAT QL: NEGATIVE
SARS-COV-2 RNA RESP QL NAA+PROBE: NOT DETECTED

## 2024-01-16 PROCEDURE — 87880 STREP A ASSAY W/OPTIC: CPT | Performed by: NURSE PRACTITIONER

## 2024-01-16 PROCEDURE — 99213 OFFICE O/P EST LOW 20 MIN: CPT | Performed by: NURSE PRACTITIONER

## 2024-01-16 PROCEDURE — U0002 COVID-19 LAB TEST NON-CDC: HCPCS | Performed by: NURSE PRACTITIONER

## 2024-01-16 NOTE — ED PROVIDER NOTES
Patient Seen in: [facility]      History     Chief Complaint   Patient presents with    Sore Throat     Stated Complaint: sore throat x this am    Subjective:   48-year-old male presents today with complaints of mild sore throat.  Son recently was tested positive for strep.  States his symptoms just started today.  Denies any fever chills no difficulty swallowing controlling secretions.  Patient denies any other symptoms or concerns.  The patient's medication list, past medical history and social history elements as listed in today's nurse's notes were reviewed and agreed (except as otherwise stated in the HPI).  The patient's family history reviewed and determined to be noncontributory to the presenting problem            Objective:   Past Medical History:   Diagnosis Date    Allergic rhinitis     Anxiety     Diabetes (HCC)     Esophageal reflux     Unspecified essential hypertension               Past Surgical History:   Procedure Laterality Date    ARTHROSCOPY, SHOULDER, SURGI                  Social History     Socioeconomic History    Marital status:    Tobacco Use    Smoking status: Never    Smokeless tobacco: Current     Types: Chew    Tobacco comments:     Uses chewing tobacco daily   Vaping Use    Vaping Use: Never used   Substance and Sexual Activity    Alcohol use: No    Drug use: No              Review of Systems    Positive for stated complaint: sore throat x this am  Other systems are as noted in HPI.  Constitutional and vital signs reviewed.      All other systems reviewed and negative except as noted above.    Physical Exam     ED Triage Vitals [01/16/24 1357]   BP    Pulse 86   Resp 18   Temp 97.1 °F (36.2 °C)   Temp src Temporal   SpO2 97 %   O2 Device None (Room air)       Current:Pulse 86   Temp 97.1 °F (36.2 °C) (Temporal)   Resp 18   Ht 177.8 cm (5' 10\")   Wt 96.2 kg   SpO2 97%   BMI 30.42 kg/m²         Physical Exam  Vitals and nursing note reviewed.   Constitutional:        Appearance: He is well-developed.   HENT:      Head: Normocephalic.      Right Ear: Tympanic membrane normal.      Left Ear: Tympanic membrane normal.      Nose: No congestion or rhinorrhea.      Mouth/Throat:      Mouth: Mucous membranes are moist.      Pharynx: Posterior oropharyngeal erythema present.   Cardiovascular:      Rate and Rhythm: Normal rate.   Pulmonary:      Effort: Pulmonary effort is normal.   Musculoskeletal:      Cervical back: Normal range of motion and neck supple.   Skin:     General: Skin is warm and dry.   Neurological:      Mental Status: He is alert and oriented to person, place, and time.               ED Course     Labs Reviewed   POCT RAPID STREP - Normal   RAPID SARS-COV-2 BY PCR - Normal                      MDM     Please note that this report has been produced using speech recognition software and may contain errors related to that system including, but not limited to, errors in grammar, punctuation, and spelling, as well as words and phrases that possibly may have been recognized inappropriately.  If there are any questions or concerns, contact the dictating provider for clarification.        Note to patient: The 21st Century Cures Act makes medical notes like these available to patients in the interest of transparency. However, this is a medical document intended as peer to peer communication. It is written in medical language and may contain abbreviations or verbiage that are unfamiliar. It may appear blunt or direct. Medical documents are intended to carry relevant information, facts as evident, and the clinical opinion of the practitioner.                                   Medical Decision Making  Differential diagnosis includes but is not limited to: Viral pharyngitis, strep throat, peritonsillar abscess, COVID-19      Today with sore throat that started today recent exposure to strep from his son.  Rapid strep here was negative.  COVID-19 testing also done and negative.   Suspect viral cause of illness.  Encourage patient to follow-up in 3 to 5 days if symptoms do not improve or especially worsen for retesting.  To take Tylenol Motrin for any fever pain.  Patient verbalized understanding and agreed plan of care.    Amount and/or Complexity of Data Reviewed  Labs: ordered.     Details: Rapid strep-negative  Rapid COVID-19-negative    Risk  OTC drugs.        Disposition and Plan     Clinical Impression:  1. Exposure to strep throat    2. Sore throat         Disposition:  Discharge  1/16/2024  2:12 pm    Follow-up:  Alo Hess MD  [address] [place] [id number]  [phone]    In 1 week  As needed          Medications Prescribed:  Discharge Medication List as of 1/16/2024  2:14 PM

## 2024-02-01 ENCOUNTER — TELEPHONE (OUTPATIENT)
Dept: FAMILY MEDICINE CLINIC | Facility: CLINIC | Age: 49
End: 2024-02-01

## 2024-02-01 ENCOUNTER — OFFICE VISIT (OUTPATIENT)
Dept: FAMILY MEDICINE CLINIC | Facility: CLINIC | Age: 49
End: 2024-02-01
Payer: COMMERCIAL

## 2024-02-01 VITALS
SYSTOLIC BLOOD PRESSURE: 125 MMHG | OXYGEN SATURATION: 98 % | TEMPERATURE: 97 F | WEIGHT: 215 LBS | BODY MASS INDEX: 31 KG/M2 | RESPIRATION RATE: 16 BRPM | DIASTOLIC BLOOD PRESSURE: 88 MMHG | HEART RATE: 88 BPM

## 2024-02-01 DIAGNOSIS — Z72.0 CHEWING TOBACCO USE: ICD-10-CM

## 2024-02-01 DIAGNOSIS — Z00.00 WELL ADULT EXAM: Primary | ICD-10-CM

## 2024-02-01 DIAGNOSIS — E11.9 TYPE 2 DIABETES MELLITUS WITHOUT COMPLICATION, WITHOUT LONG-TERM CURRENT USE OF INSULIN (HCC): ICD-10-CM

## 2024-02-01 DIAGNOSIS — Z80.42 FAMILY HISTORY OF PROSTATE CANCER: ICD-10-CM

## 2024-02-01 DIAGNOSIS — D58.2 ELEVATED HEMOGLOBIN (HCC): ICD-10-CM

## 2024-02-01 DIAGNOSIS — I10 HYPERTENSION, UNSPECIFIED TYPE: ICD-10-CM

## 2024-02-01 DIAGNOSIS — Z12.5 PROSTATE CANCER SCREENING: ICD-10-CM

## 2024-02-01 LAB
ANION GAP SERPL CALC-SCNC: 5 MMOL/L (ref 0–18)
AST SERPL-CCNC: 27 U/L (ref 15–37)
BUN BLD-MCNC: 14 MG/DL (ref 9–23)
CALCIUM BLD-MCNC: 9.9 MG/DL (ref 8.5–10.1)
CHLORIDE SERPL-SCNC: 103 MMOL/L (ref 98–112)
CHOLEST SERPL-MCNC: 187 MG/DL (ref ?–200)
CO2 SERPL-SCNC: 30 MMOL/L (ref 21–32)
COMPLEXED PSA SERPL-MCNC: 0.97 NG/ML (ref ?–4)
CREAT BLD-MCNC: 1.09 MG/DL
CREAT UR-SCNC: 147 MG/DL
EGFRCR SERPLBLD CKD-EPI 2021: 83 ML/MIN/1.73M2 (ref 60–?)
ERYTHROCYTE [DISTWIDTH] IN BLOOD BY AUTOMATED COUNT: 12.9 %
EST. AVERAGE GLUCOSE BLD GHB EST-MCNC: 189 MG/DL (ref 68–126)
FASTING PATIENT LIPID ANSWER: YES
FASTING STATUS PATIENT QL REPORTED: YES
GLUCOSE BLD-MCNC: 162 MG/DL (ref 70–99)
HBA1C MFR BLD: 8.2 % (ref ?–5.7)
HCT VFR BLD AUTO: 51.7 %
HDLC SERPL-MCNC: 62 MG/DL (ref 40–59)
HGB BLD-MCNC: 17.5 G/DL
LDLC SERPL CALC-MCNC: 93 MG/DL (ref ?–100)
MCH RBC QN AUTO: 30 PG (ref 26–34)
MCHC RBC AUTO-ENTMCNC: 33.8 G/DL (ref 31–37)
MCV RBC AUTO: 88.5 FL
MICROALBUMIN UR-MCNC: 1.28 MG/DL
MICROALBUMIN/CREAT 24H UR-RTO: 8.7 UG/MG (ref ?–30)
NONHDLC SERPL-MCNC: 125 MG/DL (ref ?–130)
OSMOLALITY SERPL CALC.SUM OF ELEC: 290 MOSM/KG (ref 275–295)
PLATELET # BLD AUTO: 298 10(3)UL (ref 150–450)
POTASSIUM SERPL-SCNC: 4.4 MMOL/L (ref 3.5–5.1)
RBC # BLD AUTO: 5.84 X10(6)UL
SODIUM SERPL-SCNC: 138 MMOL/L (ref 136–145)
TRIGL SERPL-MCNC: 191 MG/DL (ref 30–149)
TSI SER-ACNC: 2.14 MIU/ML (ref 0.36–3.74)
VLDLC SERPL CALC-MCNC: 31 MG/DL (ref 0–30)
WBC # BLD AUTO: 7.2 X10(3) UL (ref 4–11)

## 2024-02-01 PROCEDURE — 82728 ASSAY OF FERRITIN: CPT | Performed by: FAMILY MEDICINE

## 2024-02-01 PROCEDURE — 99396 PREV VISIT EST AGE 40-64: CPT | Performed by: FAMILY MEDICINE

## 2024-02-01 PROCEDURE — 3074F SYST BP LT 130 MM HG: CPT | Performed by: FAMILY MEDICINE

## 2024-02-01 PROCEDURE — 80048 BASIC METABOLIC PNL TOTAL CA: CPT | Performed by: FAMILY MEDICINE

## 2024-02-01 PROCEDURE — 84443 ASSAY THYROID STIM HORMONE: CPT | Performed by: FAMILY MEDICINE

## 2024-02-01 PROCEDURE — 83540 ASSAY OF IRON: CPT | Performed by: FAMILY MEDICINE

## 2024-02-01 PROCEDURE — 3079F DIAST BP 80-89 MM HG: CPT | Performed by: FAMILY MEDICINE

## 2024-02-01 PROCEDURE — 3052F HG A1C>EQUAL 8.0%<EQUAL 9.0%: CPT | Performed by: FAMILY MEDICINE

## 2024-02-01 PROCEDURE — 3061F NEG MICROALBUMINURIA REV: CPT | Performed by: FAMILY MEDICINE

## 2024-02-01 PROCEDURE — 83036 HEMOGLOBIN GLYCOSYLATED A1C: CPT | Performed by: FAMILY MEDICINE

## 2024-02-01 PROCEDURE — 82043 UR ALBUMIN QUANTITATIVE: CPT | Performed by: FAMILY MEDICINE

## 2024-02-01 PROCEDURE — 85027 COMPLETE CBC AUTOMATED: CPT | Performed by: FAMILY MEDICINE

## 2024-02-01 PROCEDURE — 83550 IRON BINDING TEST: CPT | Performed by: FAMILY MEDICINE

## 2024-02-01 PROCEDURE — 80061 LIPID PANEL: CPT | Performed by: FAMILY MEDICINE

## 2024-02-01 PROCEDURE — 82570 ASSAY OF URINE CREATININE: CPT | Performed by: FAMILY MEDICINE

## 2024-02-01 PROCEDURE — 84450 TRANSFERASE (AST) (SGOT): CPT | Performed by: FAMILY MEDICINE

## 2024-02-01 RX ORDER — FLASH GLUCOSE SCANNING READER
1 EACH MISCELLANEOUS DAILY
Qty: 2 EACH | Refills: 5 | Status: SHIPPED | OUTPATIENT
Start: 2024-02-01

## 2024-02-01 NOTE — PROGRESS NOTES
Cirilo Cisneros is a 48 year old male.    CC:    Chief Complaint   Patient presents with    Physical       HPI:  Yearly PX    Last PSA:   Recent Results (from the past 734331 hour(s))   PSA Screen    Collection Time: 23 11:33 AM   Result Value Ref Range    Prostate Specific Antigen Screen 1.87 <=4.00 ng/mL     *Note: Due to a large number of results and/or encounters for the requested time period, some results have not been displayed. A complete set of results can be found in Results Review.        Last lipid:  Lab Results   Component Value Date    CHOLEST 186 2023    TRIG 162 (H) 2023    HDL 61 (H) 2023    LDL 97 2023    VLDL 27 2023    NONHDLC 125 2023       Last Colon Cancer screenin - to be repeated 5 years      Immunization History   Administered Date(s) Administered    Covid-19 Vaccine Moderna 100 mcg/0.5 ml 2021, 2021, 2021    FLU VAC QIV SPLIT 3 YRS AND OLDER (95175) 2015, 2016    FLULAVAL 6 months & older 0.5 ml Prefilled syringe (99248) 2018    FLUZONE 6 months and older PFS 0.5 ml (10733) 2018, 2019, 2020, 10/30/2022, 2023    Fluarix 6 Months And Older 0.5 ml prefilled syringe (26461) 2019, 2020    HEP B 10/06/2013, 2013    HEP B, Ped/Adol 2014    Hep A, Adult 2013    Influenza 2020, 11/15/2023    Moderna Covid-19 Vaccine 50mcg/0.5ml 12yrs+ (0966-2655) 2023    TDAP 2013, 2023       Patient Active Problem List   Diagnosis    HTN (hypertension): ARB, no home BP readings to review    Type 2 diabetes mellitus without complication, without long-term current use of insulin (HCC): no current meds. Last eye exam > 1 year. He has deferred my recommendations for diabetes medication in the past. He is tired of lancing his fingers to do home sugar readings  Last A1c value was 7.7% done 2022.      Allergic rhinitis    Chewing tobacco use:  He realizes he needs to stop  Anxiety: SSRI, still effective      Allergies:  No Known Allergies   Current Meds:  Current Outpatient Medications   Medication Sig Dispense Refill    Continuous Blood Gluc  (FREESTYLE KLAUDIA 14 DAY READER) Does not apply Device 1 Device daily. Dx: DM2 non insulin, E11.9, NPI # [id number] 2 each 5    pantoprazole 40 MG Oral Tab EC Take 1 tablet (40 mg total) by mouth every morning before breakfast. 90 tablet 1    atorvastatin (LIPITOR) 10 MG Oral Tab Take 1 tablet (10 mg total) by mouth nightly. 90 tablet 2    escitalopram (LEXAPRO) 20 MG Oral Tab Take 1 tablet (20 mg total) by mouth daily. 90 tablet 2    losartan 100 MG Oral Tab Take 1 tablet (100 mg total) by mouth daily. 90 tablet 2    ONETOUCH DELICA LANCETS FINE Does not apply Misc Test 3 times daily 3 Box 3        History:  Past Medical History:   Diagnosis Date    Allergic rhinitis     Anxiety     Diabetes (HCC)     Esophageal reflux     Unspecified essential hypertension       Past Surgical History:   Procedure Laterality Date    ARTHROSCOPY, SHOULDER, SURGI        Family History   Problem Relation Age of Onset    Cancer Father         Prostate    Cancer Sister         Breast.    Heart Disease Brother 48        MI.  Alive.    Prostate Cancer Brother     Cancer Maternal Grandmother     Stroke Neg       Family Status   Relation Status    Mo Alive    Fa Alive    Sis (Not Specified)    Bro Alive    MGMA (Not Specified)    NEG (Not Specified)      Social History     Socioeconomic History    Marital status:    Tobacco Use    Smoking status: Never    Smokeless tobacco: Current     Types: Chew    Tobacco comments:     Uses chewing tobacco daily   Vaping Use    Vaping Use: Never used   Substance and Sexual Activity    Alcohol use: No    Drug use: No        ROS:  General: energy level stable  Cardiovascular/Pulses: Denies exertional chest pain or pressure  GI: Denies hematochezia   (male): Denies hematuria, brother  recently diagnosed with prostate cancer, dad has prostate cancer  Neuro: Feet can get numb while sitting in a recliner    Vitals: /88   Pulse 88   Temp 96.7 °F (35.9 °C)   Resp 16   Wt 215 lb (97.5 kg)   SpO2 98%   BMI 30.85 kg/m²    Reviewed by DONNA Hess M.D.  Wt Readings from Last 3 Encounters:   02/01/24 215 lb (97.5 kg)   01/16/24 212 lb (96.2 kg)   01/24/23 213 lb 6.4 oz (96.8 kg)       Physical Exam:  GEN: well developed, well nourished, in no apparent distress  EYE: B conjunctiva and lids normal  HENT: normocephalic; normal nose, pharynx and TM's  NECK: No lymphadenopathy, thyromegaly or masses  CAR: S1, S2 normal, RRR; no S3, no S4; no click; murmur negative  PULM: clear to auscultation B, no accessory muscle use  GI: normal active BS+, soft, nondistended; no HSM; no masses; no bruits; no masses; nontender, no G/R/R   PSYCH: alert and oriented x 3; affect appropriate  SKIN: not examined  BREAST: not examined/not applicable  EXTREMITIES: No clubbing, cyanosis or edema  RECTAL: not examined  GENITAL: not examined  LYMPH: no supraclavicular nodes  MUSCULOSKELETAL: normal ambulation  NEURO: Awake and alert. Normal speech and articulation. No facial droop or asymmetry. Moving all extremities equally. Symmetric B patellar DTRs  Bilateral barefoot skin diabetic exam is normal, visualized feet and the appearance is normal.  Bilateral monofilament/sensation of both feet is normal.  Pulsation pedal pulse exam of both lower legs/feet is normal as well.     ASSESSMENT AND PLAN    1. Well adult exam  He will contact his GI to look into a colonoscopy in the next year or so    - AST (SGOT)  - Basic Metabolic Panel (8)  - PSA Total, Screen  - Hemoglobin A1C  - Lipid Panel  - TSH W Reflex To Free T4  - Microalb/Creat Ratio, Random Urine  - CBC, Platelet; No Differential    2. Type 2 diabetes mellitus without complication, without long-term current use of insulin (HCC)  I will recommend Metformin if the A1c not  at or below 7  He is to have the diabetic eye exam that will be done this month sent to our office.    - Continuous Blood Gluc  (NovaledYLE KLAUDIA 14 DAY READER) Does not apply Device; 1 Device daily. Dx: DM2 non insulin, E11.9, NPI # [id number]  Dispense: 2 each; Refill: 5    - Hemoglobin A1C    3. Hypertension, unspecified type  Stable   Continue present medications     - Basic Metabolic Panel (8)    4. Chewing tobacco use  Encouraged cessation    5. Family history of prostate cancer      - PSA Total, Screen    6. Prostate cancer screening      - PSA Total, Screen    7. Anxiety  Stable   Continue present medications     No follow-ups on file.    Orders for this visit:    Orders Placed This Encounter   Procedures    AST (SGOT)     Standing Status:   Future     Number of Occurrences:   1     Standing Expiration Date:   2/1/2025    Basic Metabolic Panel (8)     Standing Status:   Future     Number of Occurrences:   1     Standing Expiration Date:   2/1/2025    PSA Total, Screen     Standing Status:   Future     Number of Occurrences:   1     Standing Expiration Date:   2/1/2025    Hemoglobin A1C     Standing Status:   Future     Number of Occurrences:   1     Standing Expiration Date:   2/1/2025    Lipid Panel     Standing Status:   Future     Number of Occurrences:   1     Standing Expiration Date:   2/1/2025    TSH W Reflex To Free T4     Standing Status:   Future     Number of Occurrences:   1     Standing Expiration Date:   2/1/2025    Microalb/Creat Ratio, Random Urine     Standing Status:   Future     Number of Occurrences:   1     Standing Expiration Date:   2/1/2025    CBC, Platelet; No Differential     Standing Status:   Future     Number of Occurrences:   1     Standing Expiration Date:   2/1/2025    VENIPUNCTURE     Order Specific Question:   Release to patient     Answer:   Immediate       None    Meds & Refills for this Visit:  Requested Prescriptions     Signed Prescriptions Disp Refills     Continuous Blood Gluc  (FREESTYLE KLAUDIA 14 DAY READER) Does not apply Device 2 each 5     Si Device daily. Dx: DM2 non insulin, E11.9, NPI # [id number]             Authorized by Alo Hess M.D.

## 2024-02-01 NOTE — TELEPHONE ENCOUNTER
Started PA on patient's continuous blood glucose    Questions answered    was denied     Denied   2/1/2024  3:30 PM  Case ID: [id number] Appeal supported: No Appeal instructions: Your PA request has been denied.  Additional information will be provided in the denial

## 2024-02-02 NOTE — TELEPHONE ENCOUNTER
----- Message from Shayla Hernandez RN sent at 2024  9:24 AM CST -----  Patient's name and  verified   Patient wants to know why the Micro albumin was high last time and now is back down.  Also why his RBC are elevated.     Patient will scheduled nurse visit online    Patient notified and verbalized an understanding

## 2024-02-02 NOTE — TELEPHONE ENCOUNTER
Perhaps the Losartan is exerting a better effect on the kidneys to protect them thus no spillage of micro protein this time.    Elevated RBC may be due to recent flying or a hemo-concentrating effect from the fasting state. I assume he is not taking excess iron supplements nor taking a testosterone replacement from another MD?    Thanks

## 2024-02-03 ENCOUNTER — TELEPHONE (OUTPATIENT)
Dept: FAMILY MEDICINE CLINIC | Facility: CLINIC | Age: 49
End: 2024-02-03

## 2024-02-03 LAB
DEPRECATED HBV CORE AB SER IA-ACNC: 312.8 NG/ML
IRON SATN MFR SERPL: 31 %
IRON SERPL-MCNC: 123 UG/DL
TIBC SERPL-MCNC: 393 UG/DL (ref 240–450)
TRANSFERRIN SERPL-MCNC: 264 MG/DL (ref 200–360)

## 2024-02-03 NOTE — TELEPHONE ENCOUNTER
Regarding: Fit For Duty (FFD) Exam  Contact: [phone]  ----- Message from Alo Hess MD sent at 2/2/2024  9:41 AM CST -----       ----- Message sent from Alo Hess MD to Cirilo Cisneros at 12/28/2023  1:02 PM -----   Maxim Krer,  I don't see anything significant occurring on the EKG. No worries.  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  The EKG was borderline with elevated ST/T and the doctor recommended following up with you.  Everything else seemed fine except my old eyes getting worse.  Please let me know if you have any questions.  Happy New Year.    Cirilo Cisneros

## 2024-02-03 NOTE — TELEPHONE ENCOUNTER
Patient's name and  verified   Patient is not taking excess iron supplement or not taking testosterone.   Patient notified and verbalized an understanding

## 2024-02-06 NOTE — TELEPHONE ENCOUNTER
Patient's name and  verified   Patient is amenable to starting Metformin at this time   Verified pharmacy: [id number] - [place]   Patient notified and verbalized an understanding

## 2024-02-29 ENCOUNTER — TELEPHONE (OUTPATIENT)
Dept: FAMILY MEDICINE CLINIC | Facility: CLINIC | Age: 49
End: 2024-02-29

## 2024-02-29 NOTE — TELEPHONE ENCOUNTER
Spoke to Alison to have eye exam  to have report sent to us. They stated patient eye exam is scheduled for 4/4/24    Placed reminder to call and get results

## 2024-03-07 RX ORDER — LOSARTAN POTASSIUM 100 MG/1
100 TABLET ORAL DAILY
Qty: 90 TABLET | Refills: 2 | Status: SHIPPED | OUTPATIENT
Start: 2024-03-07

## 2024-04-03 ENCOUNTER — TELEPHONE (OUTPATIENT)
Dept: FAMILY MEDICINE CLINIC | Facility: CLINIC | Age: 49
End: 2024-04-03

## 2024-04-04 ENCOUNTER — MED REC SCAN ONLY (OUTPATIENT)
Dept: FAMILY MEDICINE CLINIC | Facility: CLINIC | Age: 49
End: 2024-04-04

## 2024-05-02 ENCOUNTER — PATIENT MESSAGE (OUTPATIENT)
Dept: FAMILY MEDICINE CLINIC | Facility: CLINIC | Age: 49
End: 2024-05-02

## 2024-05-02 ENCOUNTER — LAB ENCOUNTER (OUTPATIENT)
Dept: LAB | Age: 49
End: 2024-05-02
Attending: FAMILY MEDICINE
Payer: COMMERCIAL

## 2024-05-02 DIAGNOSIS — E11.9 TYPE 2 DIABETES MELLITUS WITHOUT COMPLICATION, WITHOUT LONG-TERM CURRENT USE OF INSULIN (HCC): Primary | ICD-10-CM

## 2024-05-02 DIAGNOSIS — E11.9 TYPE 2 DIABETES MELLITUS WITHOUT COMPLICATION, WITHOUT LONG-TERM CURRENT USE OF INSULIN (HCC): ICD-10-CM

## 2024-05-02 DIAGNOSIS — E78.5 HYPERLIPIDEMIA, UNSPECIFIED HYPERLIPIDEMIA TYPE: ICD-10-CM

## 2024-05-02 LAB
CHOLEST SERPL-MCNC: 190 MG/DL (ref ?–200)
EST. AVERAGE GLUCOSE BLD GHB EST-MCNC: 174 MG/DL (ref 68–126)
FASTING PATIENT LIPID ANSWER: YES
HBA1C MFR BLD: 7.7 % (ref ?–5.7)
HDLC SERPL-MCNC: 61 MG/DL (ref 40–59)
LDLC SERPL CALC-MCNC: 103 MG/DL (ref ?–100)
NONHDLC SERPL-MCNC: 129 MG/DL (ref ?–130)
TRIGL SERPL-MCNC: 148 MG/DL (ref 30–149)
VLDLC SERPL CALC-MCNC: 25 MG/DL (ref 0–30)

## 2024-05-02 PROCEDURE — 83036 HEMOGLOBIN GLYCOSYLATED A1C: CPT | Performed by: FAMILY MEDICINE

## 2024-05-02 PROCEDURE — 80061 LIPID PANEL: CPT | Performed by: FAMILY MEDICINE

## 2024-05-02 NOTE — TELEPHONE ENCOUNTER
From: Cirilo Cisneros  To: Alo Hess  Sent: 5/2/2024 2:45 PM CDT  Subject: Lipid & A1C test results     Metformin: I’m already supposed to be taking it twice a day. Not consistent so I’ll get back on it.     Atorvasatin: Again, not consistent before bed. No need to up it yet.     Went to [employer] 2 weeks ago and hit two buffets in one day. Not smart. Diet improved but exercise consistent routine needed. Can we do same tests in 3 months and reassess?

## 2024-07-05 RX ORDER — ATORVASTATIN CALCIUM 10 MG/1
10 TABLET, FILM COATED ORAL NIGHTLY
Qty: 90 TABLET | Refills: 0 | Status: SHIPPED | OUTPATIENT
Start: 2024-07-05

## 2024-08-27 ENCOUNTER — HOSPITAL ENCOUNTER (OUTPATIENT)
Age: 49
Discharge: HOME | End: 2024-08-27
Payer: COMMERCIAL

## 2024-08-27 VITALS
HEART RATE: 81 BPM | TEMPERATURE: 97 F | DIASTOLIC BLOOD PRESSURE: 118 MMHG | RESPIRATION RATE: 16 BRPM | OXYGEN SATURATION: 98 % | SYSTOLIC BLOOD PRESSURE: 148 MMHG

## 2024-08-27 DIAGNOSIS — R21 RASH: Primary | ICD-10-CM

## 2024-08-27 PROCEDURE — 99214 OFFICE O/P EST MOD 30 MIN: CPT | Performed by: NURSE PRACTITIONER

## 2024-08-27 RX ORDER — PREDNISONE 20 MG/1
40 TABLET ORAL DAILY
Qty: 10 TABLET | Refills: 0 | Status: SHIPPED | OUTPATIENT
Start: 2024-08-27 | End: 2024-09-01

## 2024-08-27 RX ORDER — TRIAMCINOLONE ACETONIDE 1 MG/G
CREAM TOPICAL 2 TIMES DAILY
Qty: 1 EACH | Refills: 0 | Status: SHIPPED | OUTPATIENT
Start: 2024-08-27

## 2024-08-27 NOTE — DISCHARGE INSTRUCTIONS
We recommend the following for your condition:     Prednisone: take 2 tabs with breakfast x 5 days.     Zyrtec 10 mg twice a day    Triamcinolone cream; thin layer twice a day.      Cool compress    Avoid scratching     Wear protective clothing, including long sleeves and pants, when working in areas where toxic plants may be found. Keep in mind that the resin and oils from the toxic plants can be carried on clothing, pets, and under fingernails.     If you develop any fever, chills, worsening erythema, pain, red line streaking upward.

## 2024-08-29 NOTE — ED PROVIDER NOTES
Patient Seen in: [facility]      History     Chief Complaint   Patient presents with    Rash Skin Problem     Stated Complaint: rash    Subjective:   48 yo male presents with complaint of pruritic rash to legs, abdomen and groin since 4 days.   Concerned exposed to poison ivy or poison oak. However, pt was not hiking, walking, performing lawn work to obtain this exposure.     No exposure to new soaps, lotions, detergents, hot tubs or swimming pools.    Pt denies fever, chills, malaise.     The history is provided by the patient.           Objective:   Past Medical History:    Allergic rhinitis    Anxiety    Diabetes (HCC)    Esophageal reflux    Unspecified essential hypertension              Past Surgical History:   Procedure Laterality Date    Arthroscopy, shoulder, surgi                  Social History     Socioeconomic History    Marital status:    Tobacco Use    Smoking status: Never    Smokeless tobacco: Current     Types: Chew    Tobacco comments:     Uses chewing tobacco daily   Vaping Use    Vaping status: Never Used   Substance and Sexual Activity    Alcohol use: No    Drug use: No     Social Determinants of Health      [facility], [facility]              Review of Systems   Constitutional:  Negative for chills and fever.   Skin:  Positive for rash.       Positive for stated Chief Complaint: Rash Skin Problem    Other systems are as noted in HPI.  Constitutional and vital signs reviewed.      All other systems reviewed and negative except as noted above.    Physical Exam     ED Triage Vitals   BP 08/27/24 0836 (!) 148/118   Pulse 08/27/24 0815 81   Resp 08/27/24 0815 16   Temp 08/27/24 0815 97 °F (36.1 °C)   Temp src 08/27/24 0815 Temporal   SpO2 08/27/24 0815 98 %   O2 Device --        Current Vitals:   No data recorded        Physical Exam  Constitutional:       Appearance: Normal appearance. He is not ill-appearing or diaphoretic.   HENT:      Nose:  Nose normal.      Mouth/Throat:      Mouth: Mucous membranes are moist.      Pharynx: Oropharynx is clear.   Eyes:      Conjunctiva/sclera: Conjunctivae normal.   Cardiovascular:      Rate and Rhythm: Normal rate and regular rhythm.   Pulmonary:      Effort: Pulmonary effort is normal.      Breath sounds: Normal breath sounds.   Skin:     General: Skin is warm and dry.      Findings: Rash (scattered maculopapular rash to bilateral legs, abdomen.  Groin with macular rash/some scaling on right side.) present.   Neurological:      Mental Status: He is alert.   Psychiatric:         Mood and Affect: Mood normal.               ED Course   Labs Reviewed - No data to display            MDM                  Medical Decision Making  48 yo male presents with complaint of pruritic rash to legs, abdomen and groin that began 4 days  ago.  Differential diagnoses includes contact dermatitis versus insect bites versus poison oak versus irritant dermatitis.  On exam patient is well-appearing with elevated BP.  Patient states he has not taken his medication yet today.  Denies any headache, dizziness, visual changes, chest pain.  Will take the medication when he gets home.  Suggested that patient monitor his BP at home to present to his PCP if numbers remain elevated.    For rash likely contact dermatitis will send prednisone x 5 days, take Zyrtec triamcinolone cream.  Cool compress, avoid scratching.  Follow-up if symptoms or not improving in the next 3 days sooner if worsening.    Patient agreeable to plan      Amount and/or Complexity of Data Reviewed  External Data Reviewed: notes.    Risk  OTC drugs.  Prescription drug management.        Disposition and Plan     Clinical Impression:  1. Rash         Disposition:  Discharge  8/27/2024  8:53 am    Follow-up:  Alo Hess MD  [address] [facility] [address]  [phone]    In 3 days  If symptoms worsen          Medications Prescribed:  Discharge Medication List as of  8/27/2024  8:53 AM        START taking these medications    Details   predniSONE 20 MG Oral Tab Take 2 tablets (40 mg total) by mouth daily for 5 days., Normal, Disp-10 tablet, R-0      triamcinolone 0.1 % External Cream Apply topically 2 (two) times daily., Normal, Disp-1 each, R-0

## 2024-10-29 ENCOUNTER — TELEPHONE (OUTPATIENT)
Dept: FAMILY MEDICINE CLINIC | Facility: CLINIC | Age: 49
End: 2024-10-29

## 2024-10-29 NOTE — TELEPHONE ENCOUNTER
Please let patient or care giver know or leave message that refills have been sent.   He is overdue for repeat labs to check the diabetes and lipids. Orders previously placed are still valid. It can be done at one of our reference labs.   Looks like his BP was elevated at the  in 8/2024. Does he check his BP at home? If so what is the reading and what day was it done.     Thanks

## 2024-10-29 NOTE — TELEPHONE ENCOUNTER
Patient calling to request refill of metFORMIN 500 MG Oral Tab   Pharmacy [id number] - [place], [place] - [address] [phone], [phone] [[id number]]

## 2024-11-12 ENCOUNTER — NURSE ONLY (OUTPATIENT)
Dept: FAMILY MEDICINE CLINIC | Facility: CLINIC | Age: 49
End: 2024-11-12
Payer: COMMERCIAL

## 2024-11-12 DIAGNOSIS — E78.5 HYPERLIPIDEMIA, UNSPECIFIED HYPERLIPIDEMIA TYPE: ICD-10-CM

## 2024-11-12 DIAGNOSIS — E11.9 TYPE 2 DIABETES MELLITUS WITHOUT COMPLICATION, WITHOUT LONG-TERM CURRENT USE OF INSULIN (HCC): ICD-10-CM

## 2024-11-12 LAB
CHOLEST SERPL-MCNC: 156 MG/DL (ref ?–200)
EST. AVERAGE GLUCOSE BLD GHB EST-MCNC: 177 MG/DL (ref 68–126)
FASTING PATIENT LIPID ANSWER: YES
HBA1C MFR BLD: 7.8 % (ref ?–5.7)
HDLC SERPL-MCNC: 59 MG/DL (ref 40–59)
LDLC SERPL CALC-MCNC: 76 MG/DL (ref ?–100)
NONHDLC SERPL-MCNC: 97 MG/DL (ref ?–130)
TRIGL SERPL-MCNC: 119 MG/DL (ref 30–149)
VLDLC SERPL CALC-MCNC: 18 MG/DL (ref 0–30)

## 2024-11-12 PROCEDURE — 80061 LIPID PANEL: CPT | Performed by: FAMILY MEDICINE

## 2024-11-12 PROCEDURE — 83036 HEMOGLOBIN GLYCOSYLATED A1C: CPT | Performed by: FAMILY MEDICINE

## 2024-11-12 NOTE — PROGRESS NOTES
Patient to clinic for labs per Dr Hess  2 tubes drawn lt green and lavender 1 attempt  Patient left office in stable condition

## 2024-11-15 ENCOUNTER — PATIENT MESSAGE (OUTPATIENT)
Dept: FAMILY MEDICINE CLINIC | Facility: CLINIC | Age: 49
End: 2024-11-15

## 2024-11-18 RX ORDER — ESCITALOPRAM OXALATE 20 MG/1
20 TABLET ORAL DAILY
Qty: 90 TABLET | Refills: 1 | Status: SHIPPED | OUTPATIENT
Start: 2024-11-18

## 2024-11-18 RX ORDER — ATORVASTATIN CALCIUM 10 MG/1
10 TABLET, FILM COATED ORAL NIGHTLY
Qty: 90 TABLET | Refills: 1 | Status: SHIPPED | OUTPATIENT
Start: 2024-11-18

## 2025-01-28 RX ORDER — LOSARTAN POTASSIUM 100 MG/1
100 TABLET ORAL DAILY
Qty: 90 TABLET | Refills: 0 | Status: SHIPPED | OUTPATIENT
Start: 2025-01-28

## 2025-03-26 RX ORDER — ATORVASTATIN CALCIUM 10 MG/1
10 TABLET, FILM COATED ORAL NIGHTLY
Qty: 90 TABLET | Refills: 0 | Status: SHIPPED | OUTPATIENT
Start: 2025-03-26

## 2025-04-04 ENCOUNTER — TELEPHONE (OUTPATIENT)
Dept: FAMILY MEDICINE CLINIC | Facility: CLINIC | Age: 50
End: 2025-04-04

## 2025-04-15 ENCOUNTER — OFFICE VISIT (OUTPATIENT)
Dept: FAMILY MEDICINE CLINIC | Facility: CLINIC | Age: 50
End: 2025-04-15
Payer: COMMERCIAL

## 2025-04-15 VITALS
TEMPERATURE: 97 F | OXYGEN SATURATION: 96 % | BODY MASS INDEX: 29.63 KG/M2 | WEIGHT: 207 LBS | SYSTOLIC BLOOD PRESSURE: 126 MMHG | HEART RATE: 90 BPM | DIASTOLIC BLOOD PRESSURE: 86 MMHG | HEIGHT: 70 IN

## 2025-04-15 DIAGNOSIS — Z12.5 PROSTATE CANCER SCREENING: ICD-10-CM

## 2025-04-15 DIAGNOSIS — Z00.00 WELL ADULT EXAM: Primary | ICD-10-CM

## 2025-04-15 DIAGNOSIS — Z80.42 FAMILY HISTORY OF PROSTATE CANCER: ICD-10-CM

## 2025-04-15 DIAGNOSIS — M77.02 MEDIAL EPICONDYLITIS OF LEFT ELBOW: ICD-10-CM

## 2025-04-15 DIAGNOSIS — E78.5 HYPERLIPIDEMIA, UNSPECIFIED HYPERLIPIDEMIA TYPE: ICD-10-CM

## 2025-04-15 DIAGNOSIS — I10 HYPERTENSION, UNSPECIFIED TYPE: ICD-10-CM

## 2025-04-15 DIAGNOSIS — K21.9 GASTROESOPHAGEAL REFLUX DISEASE, UNSPECIFIED WHETHER ESOPHAGITIS PRESENT: ICD-10-CM

## 2025-04-15 DIAGNOSIS — E11.9 TYPE 2 DIABETES MELLITUS WITHOUT COMPLICATION, WITHOUT LONG-TERM CURRENT USE OF INSULIN (HCC): ICD-10-CM

## 2025-04-15 LAB
ALT SERPL-CCNC: 36 U/L (ref 10–49)
ANION GAP SERPL CALC-SCNC: 12 MMOL/L (ref 0–18)
AST SERPL-CCNC: 29 U/L (ref ?–34)
BUN BLD-MCNC: 12 MG/DL (ref 9–23)
CALCIUM BLD-MCNC: 10.2 MG/DL (ref 8.7–10.6)
CHLORIDE SERPL-SCNC: 104 MMOL/L (ref 98–112)
CHOLEST SERPL-MCNC: 147 MG/DL (ref ?–200)
CO2 SERPL-SCNC: 24 MMOL/L (ref 21–32)
COMPLEXED PSA SERPL-MCNC: 1.15 NG/ML (ref ?–4)
CREAT BLD-MCNC: 1.04 MG/DL (ref 0.7–1.3)
CREAT UR-SCNC: 279.9 MG/DL
EGFRCR SERPLBLD CKD-EPI 2021: 87 ML/MIN/1.73M2 (ref 60–?)
ERYTHROCYTE [DISTWIDTH] IN BLOOD BY AUTOMATED COUNT: 13.2 %
EST. AVERAGE GLUCOSE BLD GHB EST-MCNC: 169 MG/DL (ref 68–126)
FASTING PATIENT LIPID ANSWER: YES
FASTING STATUS PATIENT QL REPORTED: YES
GLUCOSE BLD-MCNC: 134 MG/DL (ref 70–99)
HBA1C MFR BLD: 7.5 % (ref ?–5.7)
HCT VFR BLD AUTO: 49.6 % (ref 39–53)
HDLC SERPL-MCNC: 61 MG/DL (ref 40–59)
HGB BLD-MCNC: 16.9 G/DL (ref 13–17.5)
LDLC SERPL CALC-MCNC: 62 MG/DL (ref ?–100)
MCH RBC QN AUTO: 30.1 PG (ref 26–34)
MCHC RBC AUTO-ENTMCNC: 34.1 G/DL (ref 31–37)
MCV RBC AUTO: 88.4 FL (ref 80–100)
MICROALBUMIN UR-MCNC: 2.2 MG/DL
MICROALBUMIN/CREAT 24H UR-RTO: 7.9 UG/MG (ref ?–30)
NONHDLC SERPL-MCNC: 86 MG/DL (ref ?–130)
OSMOLALITY SERPL CALC.SUM OF ELEC: 292 MOSM/KG (ref 275–295)
PLATELET # BLD AUTO: 292 10(3)UL (ref 150–450)
POTASSIUM SERPL-SCNC: 3.7 MMOL/L (ref 3.5–5.1)
RBC # BLD AUTO: 5.61 X10(6)UL (ref 4.3–5.7)
SODIUM SERPL-SCNC: 140 MMOL/L (ref 136–145)
TRIGL SERPL-MCNC: 140 MG/DL (ref 30–149)
TSI SER-ACNC: 1.98 UIU/ML (ref 0.55–4.78)
VLDLC SERPL CALC-MCNC: 21 MG/DL (ref 0–30)
WBC # BLD AUTO: 6.9 X10(3) UL (ref 4–11)

## 2025-04-15 PROCEDURE — 3079F DIAST BP 80-89 MM HG: CPT | Performed by: FAMILY MEDICINE

## 2025-04-15 PROCEDURE — 80048 BASIC METABOLIC PNL TOTAL CA: CPT | Performed by: FAMILY MEDICINE

## 2025-04-15 PROCEDURE — 82043 UR ALBUMIN QUANTITATIVE: CPT | Performed by: FAMILY MEDICINE

## 2025-04-15 PROCEDURE — 84443 ASSAY THYROID STIM HORMONE: CPT | Performed by: FAMILY MEDICINE

## 2025-04-15 PROCEDURE — 83036 HEMOGLOBIN GLYCOSYLATED A1C: CPT | Performed by: FAMILY MEDICINE

## 2025-04-15 PROCEDURE — 85027 COMPLETE CBC AUTOMATED: CPT | Performed by: FAMILY MEDICINE

## 2025-04-15 PROCEDURE — 84153 ASSAY OF PSA TOTAL: CPT | Performed by: FAMILY MEDICINE

## 2025-04-15 PROCEDURE — 84460 ALANINE AMINO (ALT) (SGPT): CPT | Performed by: FAMILY MEDICINE

## 2025-04-15 PROCEDURE — 84450 TRANSFERASE (AST) (SGOT): CPT | Performed by: FAMILY MEDICINE

## 2025-04-15 PROCEDURE — 99396 PREV VISIT EST AGE 40-64: CPT | Performed by: FAMILY MEDICINE

## 2025-04-15 PROCEDURE — 82570 ASSAY OF URINE CREATININE: CPT | Performed by: FAMILY MEDICINE

## 2025-04-15 PROCEDURE — 3074F SYST BP LT 130 MM HG: CPT | Performed by: FAMILY MEDICINE

## 2025-04-15 PROCEDURE — 3008F BODY MASS INDEX DOCD: CPT | Performed by: FAMILY MEDICINE

## 2025-04-15 PROCEDURE — 80061 LIPID PANEL: CPT | Performed by: FAMILY MEDICINE

## 2025-04-15 RX ORDER — SILDENAFIL 100 MG/1
100 TABLET, FILM COATED ORAL AS NEEDED
Qty: 30 TABLET | Refills: 0 | Status: SHIPPED | OUTPATIENT
Start: 2025-04-15

## 2025-04-15 NOTE — PROGRESS NOTES
Cirilo Cisneros is a 50 year old male.    CC:    Chief Complaint   Patient presents with    Physical       HPI:  Yearly PX    Last PSA:   Recent Results (from the past 806433 hours)   PSA Total, Screen    Collection Time: 02/01/24  9:28 AM   Result Value Ref Range    Prostate Specific Antigen Screen 0.97 <=4.00 ng/mL     *Note: Due to a large number of results and/or encounters for the requested time period, some results have not been displayed. A complete set of results can be found in Results Review.        Last lipid:  Lab Results   Component Value Date    CHOLEST 156 11/12/2024    TRIG 119 11/12/2024    HDL 59 11/12/2024    LDL 76 11/12/2024    VLDL 18 11/12/2024    NONHDLC 97 11/12/2024   The 10-year ASCVD risk score (Shannan LEIVA, et al., 2019) is: 5%    Values used to calculate the score:      Age: 50 years      Sex: Male      Is Non- : No      Diabetic: Yes      Tobacco smoker: No      Systolic Blood Pressure: 126 mmHg      Is BP treated: Yes      HDL Cholesterol: 59 mg/dL      Total Cholesterol: 156 mg/dL       Last Colon Cancer screening: Colonoscopy 12/30/2020 to be repeated 2025      Immunization History   Administered Date(s) Administered    Covid-19 Vaccine Moderna 100 mcg/0.5 ml 01/28/2021, 02/26/2021, 12/06/2021    FLU VAC QIV SPLIT 3 YRS AND OLDER (41133) 09/29/2015, 11/30/2016    FLULAVAL 6 months & older 0.5 ml Prefilled syringe (22208) 11/02/2018    FLUZONE 6 months and older PFS 0.5 ml (08049) 11/02/2018, 11/03/2019, 09/27/2020, 10/30/2022, 11/05/2023    Fluarix 6 Months And Older 0.5 ml prefilled syringe (54173) 11/03/2019, 09/27/2020    HEP B 10/06/2013, 11/07/2013    HEP B, Ped/Adol 04/08/2014    Hep A, Adult 12/03/2013    Influenza 11/29/2020, 11/15/2023    Moderna Covid-19 Vaccine 50mcg/0.5ml 12yrs+ 11/05/2023    TDAP 12/03/2013, 01/24/2023       Patient Active Problem List  Diagnosis    HTN (hypertension): ARB, no home BP to review    Type 2 diabetes mellitus  without complication, without long-term current use of insulin (HCC): Metformin, Last A1c value was 7.8% done 11/12/2024. Last eye exam 4/2025. Not checking home sugars. Would be interested in GLP1 if DM not controlled on labs being done today     Anxiety: stopped Lexapro, he felt it contributed to ED issues. The ED issues persist despite being off the Lexapro. He would like a med to help with ED. He does not want medication for anxiety and plans on exercising as a means to deal with it.     GERD: PPI prn works well, no dysphagia  HLD: statin as directed         Allergies as of 04/15/2025    (No Known Allergies)        Current Meds:  Current Medications[1]     History:  Past Medical History[2]   Past Surgical History[3]   Family History[4]   Family Status   Relation Status    Mo Alive    Fa Alive    Sis (Not Specified)    Bro Alive    MGMA (Not Specified)    NEG (Not Specified)      Short Social Hx on File[5]     ROS:  General: energy level stable  Cardiovascular/Pulses: Denies exertional chest pain or pressure  GI: Denies hematochezia, melena  Musculoskeletal: L medial elbow pain, does not recall inciting event   (male): Denies hematuria    Vitals: /86   Pulse 90   Temp 97 °F (36.1 °C) (Temporal)   Ht 5' 10\" (1.778 m)   Wt 207 lb (93.9 kg)   SpO2 96%   BMI 29.70 kg/m²    Reviewed by DONNA Hess M.D.    Physical Exam:  GEN: well developed, well nourished, in no apparent distress  EYE: B conjunctiva and lids normal  HENT: normocephalic; normal nose, pharynx and TM's  NECK: No lymphadenopathy, thyromegaly or masses  CAR: S1, S2 normal, RRR; no S3, no S4; no click; murmur negative  PULM: clear to auscultation B, no accessory muscle use  GI: normal active BS+, soft, nondistended; no HSM; no masses; no bruits; no masses; nontender, no G/R/R   PSYCH: alert and oriented x 3; affect appropriate  SKIN: not examined  EXTREMITIES: No clubbing, cyanosis or edema  GENITAL: not examined  LYMPH: no supraclavicular  nodes  NEURO: Awake and alert. Normal speech and articulation. No facial droop or asymmetry. Moving all extremities equally.  MS: L elbow with full ROM, + tenderness at the medial epcondyle    Bilateral barefoot skin diabetic exam is normal, visualized feet and the appearance is normal.  Bilateral monofilament/sensation of both feet is normal.  Pulsation pedal pulse exam of both lower legs/feet is normal as well.    ASSESSMENT AND PLAN    1. Well adult exam  He will contact his GI to schedule a screening colonoscopy this winter  Await results     - Hemoglobin A1C; Future  - Microalb/Creat Ratio, Random Urine; Future  - ALT(SGPT)  - AST (SGOT)  - Basic Metabolic Panel (8)  - CBC, Platelet; No Differential  - Lipid Panel  - PSA Total, Screen  - TSH W Reflex To Free T4  - Hemoglobin A1C  - Microalb/Creat Ratio, Random Urine    2. Type 2 diabetes mellitus without complication, without long-term current use of insulin (HCC)  Await results, consider Mounjaro if not well controlled on the A1c    - Basic Metabolic Panel (8)  - Hemoglobin A1C  - Microalb/Creat Ratio, Random Urine    3. Hyperlipidemia, unspecified hyperlipidemia type  Continue present medications   Await results     - ALT(SGPT)  - AST (SGOT)  - Lipid Panel    4. Hypertension, unspecified type  Stable   Continue present medications   Await results     - Basic Metabolic Panel (8)    5. Prostate cancer screening  Await results     - PSA Total, Screen    6. Family history of prostate cancer  Await results     - PSA Total, Screen    7. Gastroesophageal reflux disease, unspecified whether esophagitis present  Stable   Continue PPI prn    8. Medial epicondylitis of left elbow  He will use a tennis elbow brace reversed  He will You Tube home stretches and exercises  He will ice daily   Consider PT if no improving over the next month      No follow-ups on file.    Orders for this visit:    Orders Placed This Encounter   Procedures    ALT(SGPT)     Standing Status:    Future     Number of Occurrences:   1     Expected Date:   4/15/2025     Expiration Date:   4/15/2026    AST (SGOT)     Standing Status:   Future     Number of Occurrences:   1     Expected Date:   4/15/2025     Expiration Date:   4/15/2026    Basic Metabolic Panel (8)     Standing Status:   Future     Number of Occurrences:   1     Expected Date:   4/15/2025     Expiration Date:   4/15/2026    CBC, Platelet; No Differential     Standing Status:   Future     Number of Occurrences:   1     Expected Date:   4/15/2025     Expiration Date:   4/15/2026    Lipid Panel     Standing Status:   Future     Number of Occurrences:   1     Expected Date:   4/15/2025     Expiration Date:   4/15/2026    PSA Total, Screen     Standing Status:   Future     Number of Occurrences:   1     Expected Date:   4/15/2025     Expiration Date:   4/15/2026     Release to patient:   Immediate    TSH W Reflex To Free T4     Standing Status:   Future     Number of Occurrences:   1     Expected Date:   4/15/2025     Expiration Date:   4/15/2026    Hemoglobin A1C     Standing Status:   Future     Number of Occurrences:   1     Expected Date:   4/15/2025     Expiration Date:   4/15/2026    Microalb/Creat Ratio, Random Urine     Standing Status:   Future     Number of Occurrences:   1     Expected Date:   4/15/2025     Expiration Date:   4/15/2026    VENIPUNCTURE     Release to patient:   Immediate       None    Meds & Refills for this Visit:  Requested Prescriptions     Signed Prescriptions Disp Refills    Sildenafil Citrate (VIAGRA) 100 MG Oral Tab 30 tablet 0     Sig: Take 1 tablet (100 mg total) by mouth as needed for Erectile Dysfunction.             Authorized by Alo Hess M.D.                  [1]   Current Outpatient Medications   Medication Sig Dispense Refill    Sildenafil Citrate (VIAGRA) 100 MG Oral Tab Take 1 tablet (100 mg total) by mouth as needed for Erectile Dysfunction. 30 tablet 0    metFORMIN 500 MG Oral Tab Take 1 tablet (500  mg total) by mouth 2 (two) times daily with meals. 180 tablet 0    atorvastatin (LIPITOR) 10 MG Oral Tab Take 1 tablet (10 mg total) by mouth nightly. 90 tablet 0    losartan 100 MG Oral Tab Take 1 tablet (100 mg total) by mouth daily. 90 tablet 0    Continuous Blood Gluc  (FREESTYLE KLAUDIA 14 DAY READER) Does not apply Device 1 Device daily. Dx: DM2 non insulin, E11.9, NPI # [id number] 2 each 5    pantoprazole 40 MG Oral Tab EC Take 1 tablet (40 mg total) by mouth every morning before breakfast. 90 tablet 1    ONETOUCH DELICA LANCETS FINE Does not apply Misc Test 3 times daily 3 Box 3   [2]   Past Medical History:   Allergic rhinitis    Anxiety    Diabetes (HCC)    Esophageal reflux    Unspecified essential hypertension   [3]   Past Surgical History:  Procedure Laterality Date    Arthroscopy, shoulder, surgi     [4]   Family History  Problem Relation Age of Onset    Cancer Father         Prostate    Cancer Sister         Breast.    Heart Disease Brother 48        MI.  Alive.    Prostate Cancer Brother     Cancer Maternal Grandmother     Stroke Neg    [5]   Social History  Socioeconomic History    Marital status:    Tobacco Use    Smoking status: Never    Smokeless tobacco: Current     Types: Chew    Tobacco comments:     Uses chewing tobacco daily   Vaping Use    Vaping status: Never Used   Substance and Sexual Activity    Alcohol use: No    Drug use: No     Social Drivers of Health      Received from [facility]

## 2025-05-29 RX ORDER — LOSARTAN POTASSIUM 100 MG/1
100 TABLET ORAL DAILY
Qty: 90 TABLET | Refills: 3 | Status: SHIPPED | OUTPATIENT
Start: 2025-05-29

## 2025-07-30 RX ORDER — ATORVASTATIN CALCIUM 10 MG/1
10 TABLET, FILM COATED ORAL NIGHTLY
Qty: 90 TABLET | Refills: 2 | Status: SHIPPED | OUTPATIENT
Start: 2025-07-30

## 2025-08-10 ENCOUNTER — PATIENT MESSAGE (OUTPATIENT)
Dept: FAMILY MEDICINE CLINIC | Facility: CLINIC | Age: 50
End: 2025-08-10

## (undated) NOTE — ED AVS SNAPSHOT
THE [facility] in [facility] [address] [address]    Phone:  [phone]    Fax:  [phone]           Lilianakatherine Abner   MRN: [id number]    Department:  THE [facility] in [place]   Date of Visit:  3/12/2017           Diagn Discharge Instructions           Please follow up with your PCP if no improvement within 5-7 days. Go directly to the ER for any acute worsening of symptoms. If you smoke, stop smoking.   Push oral fluids to help loosen up chest mucous and move it out of nuestro adminstrador de vy [place] [phone]. Expect to receive an electronic request (by e-mail or text) to complete a self-assessment the day after your visit. You may also receive a call from our patient liason soon after your visit.  Also, some [facility] ([address]) [address]  ([address]) [address] [facility] ([address]) [address] [facility] [address] ([address] MyChart questions? Call [phone] for help. [facility] is NOT to be used for urgent needs. For medical emergencies, dial 911.

## (undated) NOTE — ED AVS SNAPSHOT
[facility] in [address] [address]    Phone:  [phone]    Fax:  [phone]           Jeri Mandy   MRN: [id number]    Department:  [facility] in [facility]   Date of Visit:  4/26/2017           Diagn - guaiFENesin-codeine 100-10 MG/5ML Soln              Discharge Instructions       Cheratussin is a potent cough suppressant which contains Mucinex and codeine. Take at night. Do not drive on this medication. Start Singulair at night.   Continue Flonase a substitute for ongoing medical care. Often, one Immediate Care visit does not uncover every injury or illness.  If you have been referred to a primary care or a specialist physician for a follow-up visit, please tell this physician (or your personal docto Robinson Gauthier [address] [facility] [address]) [phone]                Additional Information       We are concerned for your overall well being:    - If you are a smoker or have smoked in the last 12 months, we encourage you to explore options for [facility]

## (undated) NOTE — LETTER
[facility] [address]    2/8/2021      Dear  Jerzy Bailey    In order to provide the highest quality care, [facility] uses a sophisticated computer system to track our patient's records.

## (undated) NOTE — LETTER
06/03/20        Rebecca Peters [address] [place] [address]      Dear Hermann Obando,    To help us provide the highest quality medical care, [facility] uses a sophisticated computer system to track our patient records.  During a review